# Patient Record
Sex: FEMALE | Race: WHITE | NOT HISPANIC OR LATINO | URBAN - METROPOLITAN AREA
[De-identification: names, ages, dates, MRNs, and addresses within clinical notes are randomized per-mention and may not be internally consistent; named-entity substitution may affect disease eponyms.]

---

## 2017-02-05 ENCOUNTER — EMERGENCY (EMERGENCY)
Facility: HOSPITAL | Age: 47
LOS: 0 days | Discharge: HOME | End: 2017-02-05
Attending: EMERGENCY MEDICINE | Admitting: EMERGENCY MEDICINE
Payer: OTHER MISCELLANEOUS

## 2017-02-05 VITALS
RESPIRATION RATE: 20 BRPM | SYSTOLIC BLOOD PRESSURE: 191 MMHG | TEMPERATURE: 98 F | DIASTOLIC BLOOD PRESSURE: 123 MMHG | HEART RATE: 88 BPM

## 2017-02-05 DIAGNOSIS — S82.092A OTHER FRACTURE OF LEFT PATELLA, INITIAL ENCOUNTER FOR CLOSED FRACTURE: ICD-10-CM
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
## 2017-02-05 LAB
APPEARANCE UR: CLEAR — SIGNIFICANT CHANGE UP
BACTERIA # UR AUTO: (no result)
BILIRUB UR-MCNC: NEGATIVE — SIGNIFICANT CHANGE UP
COLOR SPEC: YELLOW — SIGNIFICANT CHANGE UP
COMMENT - URINE: SIGNIFICANT CHANGE UP
DIFF PNL FLD: (no result)
EPI CELLS # UR: (no result)
GLUCOSE UR QL: NEGATIVE MG/DL — SIGNIFICANT CHANGE UP
HCG UR QL: NEGATIVE — SIGNIFICANT CHANGE UP
KETONES UR-MCNC: NEGATIVE — SIGNIFICANT CHANGE UP
LEUKOCYTE ESTERASE UR-ACNC: NEGATIVE — SIGNIFICANT CHANGE UP
NITRITE UR-MCNC: NEGATIVE — SIGNIFICANT CHANGE UP
PH UR: 7 — SIGNIFICANT CHANGE UP (ref 4.8–8)
PROT UR-MCNC: NEGATIVE MG/DL — SIGNIFICANT CHANGE UP
RBC CASTS # UR COMP ASSIST: (no result) /HPF (ref 0–4)
SP GR SPEC: 1.01 — SIGNIFICANT CHANGE UP (ref 1.01–1.02)
UROBILINOGEN FLD QL: NEGATIVE MG/DL — SIGNIFICANT CHANGE UP
WBC UR QL: (no result)

## 2017-02-05 PROCEDURE — 99284 EMERGENCY DEPT VISIT MOD MDM: CPT

## 2017-02-05 PROCEDURE — 73590 X-RAY EXAM OF LOWER LEG: CPT | Mod: 26,LT

## 2017-02-05 PROCEDURE — 73562 X-RAY EXAM OF KNEE 3: CPT | Mod: 26,LT

## 2017-02-05 NOTE — ED STATDOCS - OBJECTIVE STATEMENT
45 y/o F presents to ED for evaluation s/p onto cement while at work. Pt states fall occurred 2 days ago. She states she struck both knees on the ground but the left knee is worse in pain. She states the pain has been worsening and is severe when her left knee is touched. Pt denies n/v/d, fever, HA, SOB. Pt states she has not had a MP in 3 months and is unsure if she is pregnant/

## 2017-02-05 NOTE — ED STATDOCS - DETAILS:
I, Man Nj, performed the initial face to face bedside interview with this patient regarding history of present illness, review of symptoms and relevant past medical, social and family history.  I completed an independent physical examination.  I was the initial provider who evaluated this patient. I have signed out the follow up of any pending tests (i.e. labs, radiological studies) to the ACP.  I have communicated the patient’s plan of care and disposition with the ACP.  The history, relevant review of systems, past medical and surgical history, medical decision making, and physical examination was documented by the scribe in my presence and I attest to the accuracy of the documentation.  I personally saw the patient with the PA, and completed the key components of the history and physical exam. I then discussed the management plan with the PA.

## 2017-02-05 NOTE — ED STATDOCS - PRINCIPAL DIAGNOSIS
Closed nondisplaced fracture of right patella with malunion, unspecified fracture morphology, subsequent encounter

## 2017-02-05 NOTE — ED STATDOCS - PROGRESS NOTE DETAILS
45 yo female presents with left knee pain s/p fall. Pt was working at [employer] and tripepd over her feet and landed on both knee but had more pressure on the left knee. Gait slowed but able to place weight on the left leg. Pain meds and x-ray. - Seamus Butler PA-C. Knee immolizer placed for the left patellar fracture. Pt has ortho that she will f/u with.

## 2017-02-05 NOTE — ED STATDOCS - CARE PLAN
Principal Discharge DX:	Closed nondisplaced fracture of right patella with malunion, unspecified fracture morphology, subsequent encounter

## 2017-02-05 NOTE — ED STATDOCS - NS ED MD SCRIBE ATTENDING SCRIBE SECTIONS
PAST MEDICAL/SURGICAL/SOCIAL HISTORY/REVIEW OF SYSTEMS/VITAL SIGNS( Pullset)/PHYSICAL EXAM/DISPOSITION/HISTORY OF PRESENT ILLNESS

## 2017-06-15 ENCOUNTER — INPATIENT (INPATIENT)
Facility: HOSPITAL | Age: 47
LOS: 0 days | Discharge: HOME | End: 2017-06-16
Attending: INTERNAL MEDICINE | Admitting: INTERNAL MEDICINE
Payer: MEDICAID

## 2017-06-15 VITALS — WEIGHT: 250 LBS | HEIGHT: 65 IN

## 2017-06-15 LAB
ALBUMIN SERPL ELPH-MCNC: 3.6 G/DL — SIGNIFICANT CHANGE UP (ref 3.3–5)
ALP SERPL-CCNC: 98 U/L — SIGNIFICANT CHANGE UP (ref 40–120)
ALT FLD-CCNC: 19 U/L — SIGNIFICANT CHANGE UP (ref 12–78)
ANION GAP SERPL CALC-SCNC: 9 MMOL/L — SIGNIFICANT CHANGE UP (ref 5–17)
AST SERPL-CCNC: 14 U/L — LOW (ref 15–37)
BASOPHILS # BLD AUTO: 0.1 K/UL — SIGNIFICANT CHANGE UP (ref 0–0.2)
BASOPHILS NFR BLD AUTO: 0.8 % — SIGNIFICANT CHANGE UP (ref 0–2)
BILIRUB SERPL-MCNC: 0.4 MG/DL — SIGNIFICANT CHANGE UP (ref 0.2–1.2)
BUN SERPL-MCNC: 13 MG/DL — SIGNIFICANT CHANGE UP (ref 7–23)
CALCIUM SERPL-MCNC: 9.4 MG/DL — SIGNIFICANT CHANGE UP (ref 8.5–10.1)
CHLORIDE SERPL-SCNC: 104 MMOL/L — SIGNIFICANT CHANGE UP (ref 96–108)
CO2 SERPL-SCNC: 25 MMOL/L — SIGNIFICANT CHANGE UP (ref 22–31)
CREAT SERPL-MCNC: 0.77 MG/DL — SIGNIFICANT CHANGE UP (ref 0.5–1.3)
EOSINOPHIL # BLD AUTO: 0.3 K/UL — SIGNIFICANT CHANGE UP (ref 0–0.5)
EOSINOPHIL NFR BLD AUTO: 2.8 % — SIGNIFICANT CHANGE UP (ref 0–6)
GLUCOSE SERPL-MCNC: 92 MG/DL — SIGNIFICANT CHANGE UP (ref 70–99)
HCT VFR BLD CALC: 41.1 % — SIGNIFICANT CHANGE UP (ref 34.5–45)
HGB BLD-MCNC: 13.6 G/DL — SIGNIFICANT CHANGE UP (ref 11.5–15.5)
LYMPHOCYTES # BLD AUTO: 2.8 K/UL — SIGNIFICANT CHANGE UP (ref 1–3.3)
LYMPHOCYTES # BLD AUTO: 30 % — SIGNIFICANT CHANGE UP (ref 13–44)
MCHC RBC-ENTMCNC: 27.7 PG — SIGNIFICANT CHANGE UP (ref 27–34)
MCHC RBC-ENTMCNC: 33.1 GM/DL — SIGNIFICANT CHANGE UP (ref 32–36)
MCV RBC AUTO: 83.7 FL — SIGNIFICANT CHANGE UP (ref 80–100)
MONOCYTES # BLD AUTO: 0.5 K/UL — SIGNIFICANT CHANGE UP (ref 0–0.9)
MONOCYTES NFR BLD AUTO: 4.9 % — SIGNIFICANT CHANGE UP (ref 2–14)
NEUTROPHILS # BLD AUTO: 5.8 K/UL — SIGNIFICANT CHANGE UP (ref 1.8–7.4)
NEUTROPHILS NFR BLD AUTO: 61.5 % — SIGNIFICANT CHANGE UP (ref 43–77)
PLATELET # BLD AUTO: 259 K/UL — SIGNIFICANT CHANGE UP (ref 150–400)
POTASSIUM SERPL-MCNC: 3.9 MMOL/L — SIGNIFICANT CHANGE UP (ref 3.5–5.3)
POTASSIUM SERPL-SCNC: 3.9 MMOL/L — SIGNIFICANT CHANGE UP (ref 3.5–5.3)
PROT SERPL-MCNC: 8 GM/DL — SIGNIFICANT CHANGE UP (ref 6–8.3)
RBC # BLD: 4.91 M/UL — SIGNIFICANT CHANGE UP (ref 3.8–5.2)
RBC # FLD: 12.4 % — SIGNIFICANT CHANGE UP (ref 10.3–14.5)
SODIUM SERPL-SCNC: 138 MMOL/L — SIGNIFICANT CHANGE UP (ref 135–145)
WBC # BLD: 9.5 K/UL — SIGNIFICANT CHANGE UP (ref 3.8–10.5)
WBC # FLD AUTO: 9.5 K/UL — SIGNIFICANT CHANGE UP (ref 3.8–10.5)

## 2017-06-15 PROCEDURE — 93970 EXTREMITY STUDY: CPT | Mod: 26

## 2017-06-15 PROCEDURE — 71020: CPT | Mod: 26

## 2017-06-15 PROCEDURE — 93010 ELECTROCARDIOGRAM REPORT: CPT

## 2017-06-15 RX ORDER — RIVAROXABAN 15 MG-20MG
15 KIT ORAL DAILY
Qty: 0 | Refills: 0 | Status: DISCONTINUED | OUTPATIENT
Start: 2017-06-15 | End: 2017-06-15

## 2017-06-15 RX ORDER — ENOXAPARIN SODIUM 100 MG/ML
110 INJECTION SUBCUTANEOUS ONCE
Qty: 0 | Refills: 0 | Status: COMPLETED | OUTPATIENT
Start: 2017-06-15 | End: 2017-06-15

## 2017-06-15 RX ORDER — FONDAPARINUX SODIUM 2.5 MG/.5ML
1 INJECTION, SOLUTION SUBCUTANEOUS
Qty: 42 | Refills: 0 | OUTPATIENT
Start: 2017-06-15 | End: 2017-07-06

## 2017-06-15 RX ORDER — MORPHINE SULFATE 50 MG/1
4 CAPSULE, EXTENDED RELEASE ORAL ONCE
Qty: 0 | Refills: 0 | Status: DISCONTINUED | OUTPATIENT
Start: 2017-06-15 | End: 2017-06-15

## 2017-06-15 RX ORDER — ONDANSETRON 8 MG/1
4 TABLET, FILM COATED ORAL ONCE
Qty: 0 | Refills: 0 | Status: COMPLETED | OUTPATIENT
Start: 2017-06-15 | End: 2017-06-15

## 2017-06-15 RX ADMIN — MORPHINE SULFATE 4 MILLIGRAM(S): 50 CAPSULE, EXTENDED RELEASE ORAL at 23:57

## 2017-06-15 RX ADMIN — ENOXAPARIN SODIUM 110 MILLIGRAM(S): 100 INJECTION SUBCUTANEOUS at 23:57

## 2017-06-15 RX ADMIN — ONDANSETRON 4 MILLIGRAM(S): 8 TABLET, FILM COATED ORAL at 23:57

## 2017-06-15 NOTE — ED ADULT NURSE NOTE - OBJECTIVE STATEMENT
Pt reports fracturing left patella and currently in treatment with ortho and PT, was doing exercises with both legs and now presents with worsening BLE edema Left>Right, denies CP/SOB, BP elevated.

## 2017-06-15 NOTE — ED STATDOCS - CARE PLAN
Principal Discharge DX:	DVT (deep venous thrombosis)  Secondary Diagnosis:	Swelling of ankle  Secondary Diagnosis:	Swelling of ankle

## 2017-06-15 NOTE — ED ADULT TRIAGE NOTE - CHIEF COMPLAINT QUOTE
Pt. to the ED C/O Bilateral Ankle pain and HTN- Pt. states she has been having swelling since June 2017 and was evaluated by Ortho- As per patient, she has also been Hypertensive - Denies CP and SOB- Pt. also denies medical hx-

## 2017-06-15 NOTE — ED STATDOCS - OBJECTIVE STATEMENT
46 y/o F with a h/o left pattellar fx, c/o bilat LE pain and swelling since 6/6, sent in by Dr. Mares (ortho) for further eval. No PCP currently. No daily meds. No CP, SOB, abd pain, fevers, chills. Pt says she has had mild leg swelling in the past that would resolve on its own. She says she has been elevating her legs with no relief.

## 2017-06-15 NOTE — ED STATDOCS - PROGRESS NOTE DETAILS
JAGDISH Chapa:   Patient has been seen, evaluated and orders have been written by the attending in intake. Patient is stable.  I will follow up the results of orders written and I will continue to evaluate/observe the patient.  Yarelis Chapa PA-C + DVT verbal message.  Awaiting report.  Pt. educated on Xarelto use and need for fall precautions.  Yarelis Chapa PA-C patient with DVT, no health insurance so unable to fill xarelto; also uncontrolled HTN  will admit to hospital    -MD Maday

## 2017-06-16 VITALS
TEMPERATURE: 98 F | DIASTOLIC BLOOD PRESSURE: 88 MMHG | RESPIRATION RATE: 16 BRPM | HEART RATE: 94 BPM | SYSTOLIC BLOOD PRESSURE: 144 MMHG | OXYGEN SATURATION: 100 %

## 2017-06-16 PROCEDURE — 99284 EMERGENCY DEPT VISIT MOD MDM: CPT

## 2017-06-16 RX ORDER — ONDANSETRON 8 MG/1
4 TABLET, FILM COATED ORAL EVERY 6 HOURS
Qty: 0 | Refills: 0 | Status: DISCONTINUED | OUTPATIENT
Start: 2017-06-16 | End: 2017-06-16

## 2017-06-16 RX ORDER — RIVAROXABAN 15 MG-20MG
15 KIT ORAL DAILY
Qty: 0 | Refills: 0 | Status: DISCONTINUED | OUTPATIENT
Start: 2017-06-16 | End: 2017-06-16

## 2017-06-16 RX ORDER — AMLODIPINE BESYLATE 2.5 MG/1
5 TABLET ORAL DAILY
Qty: 0 | Refills: 0 | Status: DISCONTINUED | OUTPATIENT
Start: 2017-06-16 | End: 2017-06-16

## 2017-06-16 RX ORDER — DOCUSATE SODIUM 100 MG
100 CAPSULE ORAL THREE TIMES A DAY
Qty: 0 | Refills: 0 | Status: DISCONTINUED | OUTPATIENT
Start: 2017-06-16 | End: 2017-06-16

## 2017-06-16 RX ADMIN — RIVAROXABAN 15 MILLIGRAM(S): KIT at 11:40

## 2017-06-16 RX ADMIN — Medication 1 TABLET(S): at 08:27

## 2017-06-16 RX ADMIN — AMLODIPINE BESYLATE 5 MILLIGRAM(S): 2.5 TABLET ORAL at 08:27

## 2017-06-16 NOTE — DISCHARGE NOTE ADULT - HOSPITAL COURSE
Patient is 46yo female with PMhx of Left Patellar Fx, no other known history, presents to the ER complaining of b/l LE swelling. pt notes since June 6th she has had b/l LE swelling, L>R, associated with pain upon walking on the left foot. Pt denies fever, chills, chest pain, SOB, recent trauma to the LE. No other constitutional symptoms. Patient has been trying to elevate both feet, with no alleviation of symptoms. In the ER, LE Duplex revealed L posterior tibial DVT.     Patient was set up with Xarelto via social work. All meds and instructions given to the patient. Instructed to follow up with Dr Mares.

## 2017-06-16 NOTE — DISCHARGE NOTE ADULT - MEDICATION SUMMARY - MEDICATIONS TO CHANGE
I will SWITCH the dose or number of times a day I take the medications listed below when I get home from the hospital:    hydroCHLOROthiazide 25 mg oral tablet  -- 1 tab(s) by mouth once a day  -- Avoid prolonged or excessive exposure to direct and/or artificial sunlight while taking this medication.  It is very important that you take or use this exactly as directed.  Do not skip doses or discontinue unless directed by your doctor.  It may be advisable to drink a full glass orange juice or eat a banana daily while taking this medication.  Take with food or milk.

## 2017-06-16 NOTE — DISCHARGE NOTE ADULT - PATIENT PORTAL LINK FT
“You can access the Follow[facility] Patient Portal, offered by [facility], by registering with the following website: [email or web address]”

## 2017-06-16 NOTE — H&P ADULT - NSHPREVIEWOFSYSTEMS_GEN_ALL_CORE
(-)Fever, chills, cough, chest pain, sob, headache, dizziness, palpitations, abd pain, n/v/d  (+) b/l leg swelling

## 2017-06-16 NOTE — H&P ADULT - NSHPPHYSICALEXAM_GEN_ALL_CORE
T(C): 37.1, Max: 37.1 (06-15 @ 20:05)  HR: 89 (87 - 92)  BP: 135/72 (135/72 - 173/112)  RR: 16 (16 - 18)  SpO2: 100% (100% - 100%)  Wt(kg): --    Gen: AAOx3, NAD  HEENT: NCAT, EOMI  Neck: Supple  CV: nml S1S2, RRR  Lungs: CTABL  Abd: Soft, NT, ND, BS+  Ext: b/l LE Edema, non pitting  Neuro: Non focal

## 2017-06-16 NOTE — ED ADULT NURSE REASSESSMENT NOTE - NS ED NURSE REASSESS COMMENT FT1
Patient sitting in bed eating breakfast. Awaiting transport to the floor. Patient comfortable and offers no complaints. Call bell within reach. Rounding complete.

## 2017-06-16 NOTE — H&P ADULT - NSHPLABSRESULTS_GEN_ALL_CORE
13.6   9.5   )-----------( 259      ( 15 Navdeep 2017 20:40 )             41.1     15 Navdeep 2017 20:40    138    |  104    |  13     ----------------------------<  92     3.9     |  25     |  0.77     Ca    9.4        15 Navdeep 2017 20:40    TPro  8.0    /  Alb  3.6    /  TBili  0.4    /  DBili  x      /  AST  14     /  ALT  19     /  AlkPhos  98     15 Navdeep 2017 20:40      CAPILLARY BLOOD GLUCOSE    LIVER FUNCTIONS - ( 15 Navdeep 2017 20:40 )  Alb: 3.6 g/dL / Pro: 8.0 gm/dL / ALK PHOS: 98 U/L / ALT: 19 U/L / AST: 14 U/L / GGT: x

## 2017-06-16 NOTE — DISCHARGE NOTE ADULT - CARE PLAN
Principal Discharge DX:	Deep vein thrombosis (DVT) of tibial vein of left lower extremity, unspecified chronicity  Goal:	Please Take Xarelto as directed, and follow up with Dr Mares  Instructions for follow-up, activity and diet:	Low salt Diet  Goal:	Take Xarelto 15mg twice per day with food for 21 days. Then take Xarelto 20mg daily.

## 2017-06-16 NOTE — H&P ADULT - ASSESSMENT
46yo female a/w DVT      # DVT  - currently afebrile, HD stable, comfortable, 97% on RA, no SOB  - DVT is in the L posterior tibial vein, and although generally below the knee DVTs do not require A/C, in this case patient is symptomatic with LE edema and painful ambulation, in setting of bone fracture, would A/C  - Xarelto  -  consult for meds access    # HTN  - no endorsed history of HTN  - would start on Norvasc 5mg daily    # DVT ppx, Xarelto    # Admit, stable

## 2017-06-16 NOTE — H&P ADULT - HISTORY OF PRESENT ILLNESS
Patient is 46yo female with PMhx of Left Patellar Fx, no other known history, presents to the ER complaining of b/l LE swelling. pt notes since June 6th she has had b/l LE swelling, L>R, associated with pain upon walking on the left foot. Pt denies fever, chills, chest pain, SOB, recent trauma to the LE. No other constitutional symptoms. Patient has been trying to elevate both feet, with no alleviation of symptoms. In the ER, LE Duplex revealed L posterior tibial DVT.

## 2017-06-16 NOTE — DISCHARGE NOTE ADULT - PLAN OF CARE
Please Take Xarelto as directed, and follow up with Dr Mares Low salt Diet Take Xarelto 15mg twice per day with food for 21 days. Then take Xarelto 20mg daily.

## 2017-06-19 ENCOUNTER — EMERGENCY (EMERGENCY)
Facility: HOSPITAL | Age: 47
LOS: 1 days | Discharge: HOME | End: 2017-06-19
Attending: EMERGENCY MEDICINE | Admitting: EMERGENCY MEDICINE
Payer: MEDICAID

## 2017-06-19 VITALS — WEIGHT: 240.08 LBS

## 2017-06-19 DIAGNOSIS — M79.89 OTHER SPECIFIED SOFT TISSUE DISORDERS: ICD-10-CM

## 2017-06-19 DIAGNOSIS — Z86.718 PERSONAL HISTORY OF OTHER VENOUS THROMBOSIS AND EMBOLISM: ICD-10-CM

## 2017-06-19 DIAGNOSIS — I82.402 ACUTE EMBOLISM AND THROMBOSIS OF UNSPECIFIED DEEP VEINS OF LEFT LOWER EXTREMITY: ICD-10-CM

## 2017-06-19 DIAGNOSIS — Z79.01 LONG TERM (CURRENT) USE OF ANTICOAGULANTS: ICD-10-CM

## 2017-06-19 PROCEDURE — 99053 MED SERV 10PM-8AM 24 HR FAC: CPT

## 2017-06-19 PROCEDURE — 99283 EMERGENCY DEPT VISIT LOW MDM: CPT

## 2017-06-19 PROCEDURE — 73590 X-RAY EXAM OF LOWER LEG: CPT | Mod: 26,LT

## 2017-06-19 PROCEDURE — 93971 EXTREMITY STUDY: CPT | Mod: 26,LT

## 2017-06-19 NOTE — ED STATDOCS - PROGRESS NOTE DETAILS
Pt with stable appearing dvt, pt is unable to get her xarelto and took her lst dose tonight, wants to stay in er overnight for  in am

## 2017-06-19 NOTE — ED STATDOCS - MEDICAL DECISION MAKING DETAILS
pt with dvt left leg and ho patellar fracture from January, was d/c recently from hospital for dvt, marisa do us to check dvt

## 2017-06-19 NOTE — ED STATDOCS - OBJECTIVE STATEMENT
48 y/o F c/o increased LLE swelling, dx with LLE DVT a few days ago. She was told by pharmacy they couldn't give her more xarelto because it needed to be approved and came to ED. Pt last took xarelto this morning and this afternoon.

## 2017-06-20 VITALS
SYSTOLIC BLOOD PRESSURE: 128 MMHG | RESPIRATION RATE: 20 BRPM | HEART RATE: 87 BPM | DIASTOLIC BLOOD PRESSURE: 62 MMHG | OXYGEN SATURATION: 100 % | TEMPERATURE: 98 F

## 2017-06-20 RX ORDER — HYDROCHLOROTHIAZIDE 25 MG
25 TABLET ORAL ONCE
Qty: 0 | Refills: 0 | Status: COMPLETED | OUTPATIENT
Start: 2017-06-20 | End: 2017-06-20

## 2017-06-20 RX ORDER — RIVAROXABAN 15 MG-20MG
15 KIT ORAL ONCE
Qty: 0 | Refills: 0 | Status: COMPLETED | OUTPATIENT
Start: 2017-06-20 | End: 2017-06-20

## 2017-06-20 RX ORDER — ENOXAPARIN SODIUM 100 MG/ML
120 INJECTION SUBCUTANEOUS
Qty: 14 | Refills: 0 | OUTPATIENT
Start: 2017-06-20 | End: 2017-06-27

## 2017-06-20 RX ADMIN — Medication 25 MILLIGRAM(S): at 10:55

## 2017-06-20 RX ADMIN — RIVAROXABAN 15 MILLIGRAM(S): KIT at 09:00

## 2017-06-21 ENCOUNTER — APPOINTMENT (OUTPATIENT)
Dept: CARDIOLOGY | Facility: CLINIC | Age: 47
End: 2017-06-21
Payer: MEDICAID

## 2017-06-21 DIAGNOSIS — M79.89 OTHER SPECIFIED SOFT TISSUE DISORDERS: ICD-10-CM

## 2017-06-21 DIAGNOSIS — I10 ESSENTIAL (PRIMARY) HYPERTENSION: ICD-10-CM

## 2017-06-21 DIAGNOSIS — I82.442 ACUTE EMBOLISM AND THROMBOSIS OF LEFT TIBIAL VEIN: ICD-10-CM

## 2017-06-21 PROCEDURE — 85610 PROTHROMBIN TIME: CPT | Mod: QW

## 2017-06-21 PROCEDURE — 36416 COLLJ CAPILLARY BLOOD SPEC: CPT

## 2017-06-21 PROCEDURE — 99204 OFFICE O/P NEW MOD 45 MIN: CPT

## 2017-06-23 ENCOUNTER — APPOINTMENT (OUTPATIENT)
Dept: CARDIOLOGY | Facility: CLINIC | Age: 47
End: 2017-06-23

## 2017-06-26 ENCOUNTER — APPOINTMENT (OUTPATIENT)
Dept: CARDIOLOGY | Facility: CLINIC | Age: 47
End: 2017-06-26

## 2017-06-28 ENCOUNTER — APPOINTMENT (OUTPATIENT)
Dept: CARDIOLOGY | Facility: CLINIC | Age: 47
End: 2017-06-28

## 2017-07-03 ENCOUNTER — APPOINTMENT (OUTPATIENT)
Dept: CARDIOLOGY | Facility: CLINIC | Age: 47
End: 2017-07-03

## 2017-07-05 ENCOUNTER — APPOINTMENT (OUTPATIENT)
Dept: CARDIOLOGY | Facility: CLINIC | Age: 47
End: 2017-07-05

## 2017-07-12 ENCOUNTER — APPOINTMENT (OUTPATIENT)
Dept: CARDIOLOGY | Facility: CLINIC | Age: 47
End: 2017-07-12

## 2017-07-19 ENCOUNTER — APPOINTMENT (OUTPATIENT)
Dept: CARDIOLOGY | Facility: CLINIC | Age: 47
End: 2017-07-19

## 2017-07-26 ENCOUNTER — APPOINTMENT (OUTPATIENT)
Dept: CARDIOLOGY | Facility: CLINIC | Age: 47
End: 2017-07-26

## 2017-08-02 ENCOUNTER — RX RENEWAL (OUTPATIENT)
Age: 47
End: 2017-08-02

## 2017-08-02 ENCOUNTER — APPOINTMENT (OUTPATIENT)
Dept: CARDIOLOGY | Facility: CLINIC | Age: 47
End: 2017-08-02
Payer: SELF-PAY

## 2017-08-02 DIAGNOSIS — I10 ESSENTIAL (PRIMARY) HYPERTENSION: ICD-10-CM

## 2017-08-02 PROCEDURE — 85610 PROTHROMBIN TIME: CPT | Mod: QW

## 2017-08-02 PROCEDURE — 99213 OFFICE O/P EST LOW 20 MIN: CPT

## 2017-08-02 PROCEDURE — 36416 COLLJ CAPILLARY BLOOD SPEC: CPT

## 2017-08-02 RX ORDER — HYDROCHLOROTHIAZIDE 25 MG/1
25 TABLET ORAL DAILY
Qty: 30 | Refills: 0 | Status: ACTIVE | COMMUNITY
Start: 2017-08-02 | End: 1900-01-01

## 2017-08-02 RX ORDER — HYDROCHLOROTHIAZIDE 25 MG/1
25 TABLET ORAL DAILY
Qty: 30 | Refills: 1 | Status: ACTIVE | COMMUNITY
Start: 2017-08-02 | End: 1900-01-01

## 2017-08-10 ENCOUNTER — APPOINTMENT (OUTPATIENT)
Dept: CARDIOLOGY | Facility: CLINIC | Age: 47
End: 2017-08-10
Payer: MEDICAID

## 2017-08-10 PROCEDURE — 36416 COLLJ CAPILLARY BLOOD SPEC: CPT

## 2017-08-10 PROCEDURE — 85610 PROTHROMBIN TIME: CPT | Mod: QW

## 2017-08-10 PROCEDURE — 99213 OFFICE O/P EST LOW 20 MIN: CPT

## 2017-08-15 ENCOUNTER — APPOINTMENT (OUTPATIENT)
Dept: CARDIOLOGY | Facility: CLINIC | Age: 47
End: 2017-08-15
Payer: SELF-PAY

## 2017-08-15 PROCEDURE — 99213 OFFICE O/P EST LOW 20 MIN: CPT

## 2017-08-15 PROCEDURE — 36416 COLLJ CAPILLARY BLOOD SPEC: CPT

## 2017-08-15 PROCEDURE — 85610 PROTHROMBIN TIME: CPT | Mod: QW

## 2017-08-18 ENCOUNTER — APPOINTMENT (OUTPATIENT)
Dept: CARDIOLOGY | Facility: CLINIC | Age: 47
End: 2017-08-18
Payer: SELF-PAY

## 2017-08-18 PROCEDURE — 36416 COLLJ CAPILLARY BLOOD SPEC: CPT

## 2017-08-18 PROCEDURE — 85610 PROTHROMBIN TIME: CPT | Mod: QW

## 2017-08-18 PROCEDURE — 99213 OFFICE O/P EST LOW 20 MIN: CPT

## 2017-08-23 ENCOUNTER — EMERGENCY (EMERGENCY)
Facility: HOSPITAL | Age: 47
LOS: 0 days | Discharge: HOME | End: 2017-08-23
Attending: EMERGENCY MEDICINE | Admitting: EMERGENCY MEDICINE
Payer: MEDICAID

## 2017-08-23 ENCOUNTER — APPOINTMENT (OUTPATIENT)
Dept: CARDIOLOGY | Facility: CLINIC | Age: 47
End: 2017-08-23
Payer: SELF-PAY

## 2017-08-23 VITALS — WEIGHT: 240.08 LBS

## 2017-08-23 VITALS — TEMPERATURE: 99 F

## 2017-08-23 DIAGNOSIS — Z88.1 ALLERGY STATUS TO OTHER ANTIBIOTIC AGENTS STATUS: ICD-10-CM

## 2017-08-23 DIAGNOSIS — N39.0 URINARY TRACT INFECTION, SITE NOT SPECIFIED: ICD-10-CM

## 2017-08-23 DIAGNOSIS — I82.409 ACUTE EMBOLISM AND THROMBOSIS OF UNSPECIFIED DEEP VEINS OF UNSPECIFIED LOWER EXTREMITY: ICD-10-CM

## 2017-08-23 DIAGNOSIS — R35.0 FREQUENCY OF MICTURITION: ICD-10-CM

## 2017-08-23 DIAGNOSIS — Z79.01 LONG TERM (CURRENT) USE OF ANTICOAGULANTS: ICD-10-CM

## 2017-08-23 DIAGNOSIS — Z88.2 ALLERGY STATUS TO SULFONAMIDES: ICD-10-CM

## 2017-08-23 LAB
APPEARANCE UR: (no result)
BACTERIA # UR AUTO: (no result)
BILIRUB UR-MCNC: NEGATIVE — SIGNIFICANT CHANGE UP
COLOR SPEC: (no result)
DIFF PNL FLD: (no result)
EPI CELLS # UR: SIGNIFICANT CHANGE UP
GLUCOSE UR QL: NEGATIVE MG/DL — SIGNIFICANT CHANGE UP
KETONES UR-MCNC: (no result)
LEUKOCYTE ESTERASE UR-ACNC: (no result)
NITRITE UR-MCNC: NEGATIVE — SIGNIFICANT CHANGE UP
PH UR: 6.5 — SIGNIFICANT CHANGE UP (ref 5–8)
PROT UR-MCNC: 500 MG/DL
RBC CASTS # UR COMP ASSIST: >50 /HPF (ref 0–4)
SP GR SPEC: 1.02 — SIGNIFICANT CHANGE UP (ref 1.01–1.02)
UROBILINOGEN FLD QL: NEGATIVE MG/DL — SIGNIFICANT CHANGE UP
WBC UR QL: (no result)

## 2017-08-23 PROCEDURE — 99284 EMERGENCY DEPT VISIT MOD MDM: CPT

## 2017-08-23 PROCEDURE — 99213 OFFICE O/P EST LOW 20 MIN: CPT

## 2017-08-23 PROCEDURE — 85610 PROTHROMBIN TIME: CPT | Mod: QW

## 2017-08-23 PROCEDURE — 36416 COLLJ CAPILLARY BLOOD SPEC: CPT

## 2017-08-23 RX ORDER — CIPROFLOXACIN LACTATE 400MG/40ML
500 VIAL (ML) INTRAVENOUS ONCE
Qty: 0 | Refills: 0 | Status: COMPLETED | OUTPATIENT
Start: 2017-08-23 | End: 2017-08-23

## 2017-08-23 RX ORDER — CIPROFLOXACIN LACTATE 400MG/40ML
1 VIAL (ML) INTRAVENOUS
Qty: 14 | Refills: 0 | OUTPATIENT
Start: 2017-08-23 | End: 2017-08-30

## 2017-08-23 RX ADMIN — Medication 500 MILLIGRAM(S): at 09:50

## 2017-08-23 NOTE — ED PROVIDER NOTE - OBJECTIVE STATEMENT
46 y/o F with a PMHx of DVT presents to the ED c/o worsening increased urinary urgency with little to no output and L flank pain over the past 4-5 days. Pt states that she is not experiencing dysuria and is not febrile here in ED. Pt currently laying comfortably and denies any other acute c/o at this time.

## 2017-08-24 ENCOUNTER — EMERGENCY (EMERGENCY)
Facility: HOSPITAL | Age: 47
LOS: 0 days | Discharge: HOME | End: 2017-08-24
Attending: EMERGENCY MEDICINE | Admitting: EMERGENCY MEDICINE
Payer: MEDICAID

## 2017-08-24 VITALS
RESPIRATION RATE: 19 BRPM | DIASTOLIC BLOOD PRESSURE: 88 MMHG | OXYGEN SATURATION: 100 % | HEART RATE: 85 BPM | TEMPERATURE: 99 F | SYSTOLIC BLOOD PRESSURE: 145 MMHG

## 2017-08-24 VITALS — HEIGHT: 65 IN | WEIGHT: 240.08 LBS

## 2017-08-24 DIAGNOSIS — Z88.1 ALLERGY STATUS TO OTHER ANTIBIOTIC AGENTS STATUS: ICD-10-CM

## 2017-08-24 DIAGNOSIS — Z51.81 ENCOUNTER FOR THERAPEUTIC DRUG LEVEL MONITORING: ICD-10-CM

## 2017-08-24 DIAGNOSIS — R10.9 UNSPECIFIED ABDOMINAL PAIN: ICD-10-CM

## 2017-08-24 DIAGNOSIS — Z88.2 ALLERGY STATUS TO SULFONAMIDES: ICD-10-CM

## 2017-08-24 DIAGNOSIS — I82.409 ACUTE EMBOLISM AND THROMBOSIS OF UNSPECIFIED DEEP VEINS OF UNSPECIFIED LOWER EXTREMITY: ICD-10-CM

## 2017-08-24 DIAGNOSIS — Z79.01 LONG TERM (CURRENT) USE OF ANTICOAGULANTS: ICD-10-CM

## 2017-08-24 LAB
ALBUMIN SERPL ELPH-MCNC: 3.6 G/DL — SIGNIFICANT CHANGE UP (ref 3.3–5)
ALP SERPL-CCNC: 99 U/L — SIGNIFICANT CHANGE UP (ref 40–120)
ALT FLD-CCNC: 25 U/L — SIGNIFICANT CHANGE UP (ref 12–78)
ANION GAP SERPL CALC-SCNC: 8 MMOL/L — SIGNIFICANT CHANGE UP (ref 5–17)
APPEARANCE UR: CLEAR — SIGNIFICANT CHANGE UP
APTT BLD: 35.9 SEC — SIGNIFICANT CHANGE UP (ref 27.5–37.4)
AST SERPL-CCNC: 17 U/L — SIGNIFICANT CHANGE UP (ref 15–37)
BACTERIA # UR AUTO: (no result)
BASOPHILS # BLD AUTO: 0 K/UL — SIGNIFICANT CHANGE UP (ref 0–0.2)
BASOPHILS NFR BLD AUTO: 0.5 % — SIGNIFICANT CHANGE UP (ref 0–2)
BILIRUB SERPL-MCNC: 0.4 MG/DL — SIGNIFICANT CHANGE UP (ref 0.2–1.2)
BILIRUB UR-MCNC: NEGATIVE — SIGNIFICANT CHANGE UP
BUN SERPL-MCNC: 12 MG/DL — SIGNIFICANT CHANGE UP (ref 7–23)
CALCIUM SERPL-MCNC: 9 MG/DL — SIGNIFICANT CHANGE UP (ref 8.5–10.1)
CHLORIDE SERPL-SCNC: 101 MMOL/L — SIGNIFICANT CHANGE UP (ref 96–108)
CO2 SERPL-SCNC: 28 MMOL/L — SIGNIFICANT CHANGE UP (ref 22–31)
COLOR SPEC: YELLOW — SIGNIFICANT CHANGE UP
CREAT SERPL-MCNC: 1.08 MG/DL — SIGNIFICANT CHANGE UP (ref 0.5–1.3)
DIFF PNL FLD: (no result)
EOSINOPHIL # BLD AUTO: 0.3 K/UL — SIGNIFICANT CHANGE UP (ref 0–0.5)
EOSINOPHIL NFR BLD AUTO: 3.7 % — SIGNIFICANT CHANGE UP (ref 0–6)
EPI CELLS # UR: SIGNIFICANT CHANGE UP
GLUCOSE SERPL-MCNC: 118 MG/DL — HIGH (ref 70–99)
GLUCOSE UR QL: NEGATIVE MG/DL — SIGNIFICANT CHANGE UP
HCT VFR BLD CALC: 39.2 % — SIGNIFICANT CHANGE UP (ref 34.5–45)
HGB BLD-MCNC: 13.6 G/DL — SIGNIFICANT CHANGE UP (ref 11.5–15.5)
INR BLD: 1.9 RATIO — HIGH (ref 0.88–1.16)
KETONES UR-MCNC: NEGATIVE — SIGNIFICANT CHANGE UP
LEUKOCYTE ESTERASE UR-ACNC: (no result)
LIDOCAIN IGE QN: 127 U/L — SIGNIFICANT CHANGE UP (ref 73–393)
LYMPHOCYTES # BLD AUTO: 2.9 K/UL — SIGNIFICANT CHANGE UP (ref 1–3.3)
LYMPHOCYTES # BLD AUTO: 32.9 % — SIGNIFICANT CHANGE UP (ref 13–44)
MCHC RBC-ENTMCNC: 27.9 PG — SIGNIFICANT CHANGE UP (ref 27–34)
MCHC RBC-ENTMCNC: 34.6 GM/DL — SIGNIFICANT CHANGE UP (ref 32–36)
MCV RBC AUTO: 80.7 FL — SIGNIFICANT CHANGE UP (ref 80–100)
MONOCYTES # BLD AUTO: 0.3 K/UL — SIGNIFICANT CHANGE UP (ref 0–0.9)
MONOCYTES NFR BLD AUTO: 3.3 % — SIGNIFICANT CHANGE UP (ref 2–14)
NEUTROPHILS # BLD AUTO: 5.2 K/UL — SIGNIFICANT CHANGE UP (ref 1.8–7.4)
NEUTROPHILS NFR BLD AUTO: 59.7 % — SIGNIFICANT CHANGE UP (ref 43–77)
NITRITE UR-MCNC: NEGATIVE — SIGNIFICANT CHANGE UP
PH UR: 6 — SIGNIFICANT CHANGE UP (ref 5–8)
PLATELET # BLD AUTO: 255 K/UL — SIGNIFICANT CHANGE UP (ref 150–400)
POTASSIUM SERPL-MCNC: 2.9 MMOL/L — CRITICAL LOW (ref 3.5–5.3)
POTASSIUM SERPL-SCNC: 2.9 MMOL/L — CRITICAL LOW (ref 3.5–5.3)
PROT SERPL-MCNC: 7.6 GM/DL — SIGNIFICANT CHANGE UP (ref 6–8.3)
PROT UR-MCNC: 15 MG/DL
PROTHROM AB SERPL-ACNC: 20.8 SEC — HIGH (ref 9.8–12.7)
RBC # BLD: 4.86 M/UL — SIGNIFICANT CHANGE UP (ref 3.8–5.2)
RBC # FLD: 12.8 % — SIGNIFICANT CHANGE UP (ref 10.3–14.5)
RBC CASTS # UR COMP ASSIST: (no result) /HPF (ref 0–4)
SODIUM SERPL-SCNC: 137 MMOL/L — SIGNIFICANT CHANGE UP (ref 135–145)
SP GR SPEC: 1.01 — SIGNIFICANT CHANGE UP (ref 1.01–1.02)
UROBILINOGEN FLD QL: NEGATIVE MG/DL — SIGNIFICANT CHANGE UP
WBC # BLD: 8.8 K/UL — SIGNIFICANT CHANGE UP (ref 3.8–10.5)
WBC # FLD AUTO: 8.8 K/UL — SIGNIFICANT CHANGE UP (ref 3.8–10.5)
WBC UR QL: (no result)

## 2017-08-24 PROCEDURE — 99053 MED SERV 10PM-8AM 24 HR FAC: CPT

## 2017-08-24 PROCEDURE — 74176 CT ABD & PELVIS W/O CONTRAST: CPT | Mod: 26

## 2017-08-24 PROCEDURE — 99284 EMERGENCY DEPT VISIT MOD MDM: CPT | Mod: 25

## 2017-08-24 RX ORDER — FONDAPARINUX SODIUM 2.5 MG/.5ML
1 INJECTION, SOLUTION SUBCUTANEOUS
Qty: 0 | Refills: 0 | COMMUNITY

## 2017-08-24 RX ORDER — IBUPROFEN 200 MG
600 TABLET ORAL ONCE
Qty: 0 | Refills: 0 | Status: COMPLETED | OUTPATIENT
Start: 2017-08-24 | End: 2017-08-24

## 2017-08-24 RX ORDER — POTASSIUM CHLORIDE 20 MEQ
40 PACKET (EA) ORAL EVERY 4 HOURS
Qty: 0 | Refills: 0 | Status: DISCONTINUED | OUTPATIENT
Start: 2017-08-24 | End: 2017-08-24

## 2017-08-24 RX ORDER — CIPROFLOXACIN LACTATE 400MG/40ML
400 VIAL (ML) INTRAVENOUS ONCE
Qty: 0 | Refills: 0 | Status: COMPLETED | OUTPATIENT
Start: 2017-08-24 | End: 2017-08-24

## 2017-08-24 RX ORDER — CIPROFLOXACIN LACTATE 400MG/40ML
500 VIAL (ML) INTRAVENOUS ONCE
Qty: 0 | Refills: 0 | Status: COMPLETED | OUTPATIENT
Start: 2017-08-24 | End: 2017-08-24

## 2017-08-24 RX ORDER — SODIUM CHLORIDE 9 MG/ML
1000 INJECTION INTRAMUSCULAR; INTRAVENOUS; SUBCUTANEOUS ONCE
Qty: 0 | Refills: 0 | Status: COMPLETED | OUTPATIENT
Start: 2017-08-24 | End: 2017-08-24

## 2017-08-24 RX ORDER — ACETAMINOPHEN 500 MG
1000 TABLET ORAL ONCE
Qty: 0 | Refills: 0 | Status: COMPLETED | OUTPATIENT
Start: 2017-08-24 | End: 2017-08-24

## 2017-08-24 RX ORDER — ONDANSETRON 8 MG/1
4 TABLET, FILM COATED ORAL ONCE
Qty: 0 | Refills: 0 | Status: COMPLETED | OUTPATIENT
Start: 2017-08-24 | End: 2017-08-24

## 2017-08-24 RX ADMIN — Medication 600 MILLIGRAM(S): at 21:48

## 2017-08-24 RX ADMIN — Medication 200 MILLIGRAM(S): at 21:40

## 2017-08-24 RX ADMIN — ONDANSETRON 4 MILLIGRAM(S): 8 TABLET, FILM COATED ORAL at 22:55

## 2017-08-24 RX ADMIN — Medication 40 MILLIEQUIVALENT(S): at 22:55

## 2017-08-24 RX ADMIN — Medication 1000 MILLIGRAM(S): at 21:48

## 2017-08-24 RX ADMIN — SODIUM CHLORIDE 1000 MILLILITER(S): 9 INJECTION INTRAMUSCULAR; INTRAVENOUS; SUBCUTANEOUS at 21:48

## 2017-08-24 NOTE — ED STATDOCS - PROGRESS NOTE DETAILS
JAGDISH Paulino:  Pt seen and exmaine dby intake provider. she presented with flank pain and dysuria, was seen in ED yesterday and d rosa maria on cipro without any resolution of her sxs. I will followup labs, UA and CT A/P to r/o kidney stone. JAGDISH Paulino:  Pt with subtherapeutic INR, s/w pt stated she took 5 mg of coumadin today, (alternates between 2.5 mg and 5 mg). Her INR yesterday was 2.5. Advised her to get it rechecked on saturday. Aware that abx can cause the INR to be low. JAGDISH Paulino:  Pt aware that cipro can affect the INR levels, she has already contacted the coumadin clinic.

## 2017-08-24 NOTE — ED STATDOCS - ATTENDING CONTRIBUTION TO CARE
Attending Contribution to Care: I, Shakira Francis, performed the initial face to face bedside interview with this patient regarding history of present illness, review of symptoms and relevant past medical, social and family history.  I completed an independent physical examination.  I was the initial provider who evaluated this patient. I have signed out the follow up of any pending tests (i.e. labs, radiological studies) to the ACP.  I have communicated the patient’s plan of care and disposition with the ACP.

## 2017-08-24 NOTE — ED STATDOCS - OBJECTIVE STATEMENT
46 yo female h/o recent DVT on coumadin, presents with left flank pain and hematuria. Pt was evaluated here yesterday, given Cipro for UTI. Does not have PMD.

## 2017-08-24 NOTE — ED ADULT TRIAGE NOTE - CHIEF COMPLAINT QUOTE
Pt. to the ED C/O Left Sided Flank Pain that radiates to the back with urinary symptoms- Pt. states she was evaluated in the ED 24 Hrs ago for UTI Symptoms with Cipro PO. Hx. of recent blood clot in left leg, on coumadin

## 2017-08-24 NOTE — ED ADULT NURSE NOTE - OBJECTIVE STATEMENT
Pt presents to ER c/o left sided flank pain radiating across back. Pt reports blood in urine yesterday, denies burning urination. Pt denies trauma. Bladder is non distended non tender. AO x 3 oriented to baseline, normal breathing pattern with no difficulty

## 2017-08-25 LAB
-  AMIKACIN: SIGNIFICANT CHANGE UP
-  AMPICILLIN/SULBACTAM: SIGNIFICANT CHANGE UP
-  AMPICILLIN: SIGNIFICANT CHANGE UP
-  AZTREONAM: SIGNIFICANT CHANGE UP
-  CEFAZOLIN: SIGNIFICANT CHANGE UP
-  CEFEPIME: SIGNIFICANT CHANGE UP
-  CEFOXITIN: SIGNIFICANT CHANGE UP
-  CEFTAZIDIME: SIGNIFICANT CHANGE UP
-  CEFTRIAXONE: SIGNIFICANT CHANGE UP
-  CIPROFLOXACIN: SIGNIFICANT CHANGE UP
-  ERTAPENEM: SIGNIFICANT CHANGE UP
-  GENTAMICIN: SIGNIFICANT CHANGE UP
-  LEVOFLOXACIN: SIGNIFICANT CHANGE UP
-  MEROPENEM: SIGNIFICANT CHANGE UP
-  NITROFURANTOIN: SIGNIFICANT CHANGE UP
-  PIPERACILLIN/TAZOBACTAM: SIGNIFICANT CHANGE UP
-  TOBRAMYCIN: SIGNIFICANT CHANGE UP
-  TRIMETHOPRIM/SULFAMETHOXAZOLE: SIGNIFICANT CHANGE UP
CULTURE RESULTS: SIGNIFICANT CHANGE UP
METHOD TYPE: SIGNIFICANT CHANGE UP
ORGANISM # SPEC MICROSCOPIC CNT: SIGNIFICANT CHANGE UP
ORGANISM # SPEC MICROSCOPIC CNT: SIGNIFICANT CHANGE UP
SPECIMEN SOURCE: SIGNIFICANT CHANGE UP

## 2017-08-26 ENCOUNTER — APPOINTMENT (OUTPATIENT)
Dept: CARDIOLOGY | Facility: CLINIC | Age: 47
End: 2017-08-26
Payer: SELF-PAY

## 2017-08-26 PROCEDURE — 99213 OFFICE O/P EST LOW 20 MIN: CPT

## 2017-08-26 PROCEDURE — 85610 PROTHROMBIN TIME: CPT | Mod: QW

## 2017-08-26 PROCEDURE — 36416 COLLJ CAPILLARY BLOOD SPEC: CPT

## 2017-08-30 ENCOUNTER — APPOINTMENT (OUTPATIENT)
Dept: CARDIOLOGY | Facility: CLINIC | Age: 47
End: 2017-08-30
Payer: SELF-PAY

## 2017-08-30 PROCEDURE — 85610 PROTHROMBIN TIME: CPT | Mod: QW

## 2017-08-30 PROCEDURE — 99213 OFFICE O/P EST LOW 20 MIN: CPT

## 2017-08-30 PROCEDURE — 36416 COLLJ CAPILLARY BLOOD SPEC: CPT

## 2017-09-06 ENCOUNTER — APPOINTMENT (OUTPATIENT)
Dept: CARDIOLOGY | Facility: CLINIC | Age: 47
End: 2017-09-06
Payer: SELF-PAY

## 2017-09-06 PROCEDURE — 99213 OFFICE O/P EST LOW 20 MIN: CPT

## 2017-09-06 PROCEDURE — 85610 PROTHROMBIN TIME: CPT | Mod: QW

## 2017-09-06 PROCEDURE — 36416 COLLJ CAPILLARY BLOOD SPEC: CPT

## 2017-09-13 ENCOUNTER — APPOINTMENT (OUTPATIENT)
Dept: CARDIOLOGY | Facility: CLINIC | Age: 47
End: 2017-09-13
Payer: MEDICAID

## 2017-09-13 DIAGNOSIS — Z91.89 OTHER SPECIFIED PERSONAL RISK FACTORS, NOT ELSEWHERE CLASSIFIED: ICD-10-CM

## 2017-09-13 PROCEDURE — 99213 OFFICE O/P EST LOW 20 MIN: CPT

## 2017-09-13 PROCEDURE — 85610 PROTHROMBIN TIME: CPT | Mod: QW

## 2017-09-13 PROCEDURE — 36416 COLLJ CAPILLARY BLOOD SPEC: CPT

## 2017-09-19 ENCOUNTER — APPOINTMENT (OUTPATIENT)
Dept: CARDIOLOGY | Facility: CLINIC | Age: 47
End: 2017-09-19
Payer: MEDICAID

## 2017-09-19 PROCEDURE — 99213 OFFICE O/P EST LOW 20 MIN: CPT

## 2017-09-19 PROCEDURE — 85610 PROTHROMBIN TIME: CPT | Mod: QW

## 2017-09-19 PROCEDURE — 36416 COLLJ CAPILLARY BLOOD SPEC: CPT

## 2017-09-21 ENCOUNTER — EMERGENCY (EMERGENCY)
Facility: HOSPITAL | Age: 47
LOS: 0 days | Discharge: HOME | End: 2017-09-21
Attending: EMERGENCY MEDICINE | Admitting: EMERGENCY MEDICINE
Payer: MEDICAID

## 2017-09-21 VITALS
DIASTOLIC BLOOD PRESSURE: 90 MMHG | SYSTOLIC BLOOD PRESSURE: 157 MMHG | TEMPERATURE: 98 F | OXYGEN SATURATION: 100 % | HEART RATE: 82 BPM | RESPIRATION RATE: 20 BRPM

## 2017-09-21 VITALS — WEIGHT: 259.93 LBS

## 2017-09-21 DIAGNOSIS — Z88.2 ALLERGY STATUS TO SULFONAMIDES: ICD-10-CM

## 2017-09-21 DIAGNOSIS — Z88.1 ALLERGY STATUS TO OTHER ANTIBIOTIC AGENTS STATUS: ICD-10-CM

## 2017-09-21 DIAGNOSIS — I10 ESSENTIAL (PRIMARY) HYPERTENSION: ICD-10-CM

## 2017-09-21 DIAGNOSIS — R42 DIZZINESS AND GIDDINESS: ICD-10-CM

## 2017-09-21 DIAGNOSIS — Z86.718 PERSONAL HISTORY OF OTHER VENOUS THROMBOSIS AND EMBOLISM: ICD-10-CM

## 2017-09-21 DIAGNOSIS — H81.10 BENIGN PAROXYSMAL VERTIGO, UNSPECIFIED EAR: ICD-10-CM

## 2017-09-21 DIAGNOSIS — Z79.01 LONG TERM (CURRENT) USE OF ANTICOAGULANTS: ICD-10-CM

## 2017-09-21 DIAGNOSIS — N39.0 URINARY TRACT INFECTION, SITE NOT SPECIFIED: ICD-10-CM

## 2017-09-21 LAB
ALBUMIN SERPL ELPH-MCNC: 3.8 G/DL — SIGNIFICANT CHANGE UP (ref 3.3–5)
ALP SERPL-CCNC: 105 U/L — SIGNIFICANT CHANGE UP (ref 40–120)
ALT FLD-CCNC: 38 U/L — SIGNIFICANT CHANGE UP (ref 12–78)
ANION GAP SERPL CALC-SCNC: 8 MMOL/L — SIGNIFICANT CHANGE UP (ref 5–17)
APPEARANCE UR: CLEAR — SIGNIFICANT CHANGE UP
APTT BLD: 37.3 SEC — SIGNIFICANT CHANGE UP (ref 27.5–37.4)
AST SERPL-CCNC: 30 U/L — SIGNIFICANT CHANGE UP (ref 15–37)
BACTERIA # UR AUTO: (no result)
BASOPHILS # BLD AUTO: 0.1 K/UL — SIGNIFICANT CHANGE UP (ref 0–0.2)
BASOPHILS NFR BLD AUTO: 1 % — SIGNIFICANT CHANGE UP (ref 0–2)
BILIRUB SERPL-MCNC: 0.6 MG/DL — SIGNIFICANT CHANGE UP (ref 0.2–1.2)
BILIRUB UR-MCNC: NEGATIVE — SIGNIFICANT CHANGE UP
BUN SERPL-MCNC: 16 MG/DL — SIGNIFICANT CHANGE UP (ref 7–23)
CALCIUM SERPL-MCNC: 9 MG/DL — SIGNIFICANT CHANGE UP (ref 8.5–10.1)
CHLORIDE SERPL-SCNC: 100 MMOL/L — SIGNIFICANT CHANGE UP (ref 96–108)
CO2 SERPL-SCNC: 29 MMOL/L — SIGNIFICANT CHANGE UP (ref 22–31)
COLOR SPEC: YELLOW — SIGNIFICANT CHANGE UP
CREAT SERPL-MCNC: 0.85 MG/DL — SIGNIFICANT CHANGE UP (ref 0.5–1.3)
DIFF PNL FLD: (no result)
EOSINOPHIL # BLD AUTO: 0.2 K/UL — SIGNIFICANT CHANGE UP (ref 0–0.5)
EOSINOPHIL NFR BLD AUTO: 2.5 % — SIGNIFICANT CHANGE UP (ref 0–6)
GLUCOSE SERPL-MCNC: 105 MG/DL — HIGH (ref 70–99)
GLUCOSE UR QL: NEGATIVE MG/DL — SIGNIFICANT CHANGE UP
HCT VFR BLD CALC: 40.2 % — SIGNIFICANT CHANGE UP (ref 34.5–45)
HGB BLD-MCNC: 13.5 G/DL — SIGNIFICANT CHANGE UP (ref 11.5–15.5)
INR BLD: 1.93 RATIO — HIGH (ref 0.88–1.16)
KETONES UR-MCNC: NEGATIVE — SIGNIFICANT CHANGE UP
LEUKOCYTE ESTERASE UR-ACNC: (no result)
LYMPHOCYTES # BLD AUTO: 1.7 K/UL — SIGNIFICANT CHANGE UP (ref 1–3.3)
LYMPHOCYTES # BLD AUTO: 19.1 % — SIGNIFICANT CHANGE UP (ref 13–44)
MCHC RBC-ENTMCNC: 27.3 PG — SIGNIFICANT CHANGE UP (ref 27–34)
MCHC RBC-ENTMCNC: 33.6 GM/DL — SIGNIFICANT CHANGE UP (ref 32–36)
MCV RBC AUTO: 81.2 FL — SIGNIFICANT CHANGE UP (ref 80–100)
MONOCYTES # BLD AUTO: 0.4 K/UL — SIGNIFICANT CHANGE UP (ref 0–0.9)
MONOCYTES NFR BLD AUTO: 5 % — SIGNIFICANT CHANGE UP (ref 2–14)
NEUTROPHILS # BLD AUTO: 6.3 K/UL — SIGNIFICANT CHANGE UP (ref 1.8–7.4)
NEUTROPHILS NFR BLD AUTO: 72.4 % — SIGNIFICANT CHANGE UP (ref 43–77)
NITRITE UR-MCNC: NEGATIVE — SIGNIFICANT CHANGE UP
PH UR: 6 — SIGNIFICANT CHANGE UP (ref 5–8)
PLATELET # BLD AUTO: 231 K/UL — SIGNIFICANT CHANGE UP (ref 150–400)
POTASSIUM SERPL-MCNC: 3.2 MMOL/L — LOW (ref 3.5–5.3)
POTASSIUM SERPL-SCNC: 3.2 MMOL/L — LOW (ref 3.5–5.3)
PROT SERPL-MCNC: 8.4 G/DL — HIGH (ref 6–8.3)
PROT UR-MCNC: 15 MG/DL
PROTHROM AB SERPL-ACNC: 21.1 SEC — HIGH (ref 9.8–12.7)
RBC # BLD: 4.94 M/UL — SIGNIFICANT CHANGE UP (ref 3.8–5.2)
RBC # FLD: 13.1 % — SIGNIFICANT CHANGE UP (ref 10.3–14.5)
RBC CASTS # UR COMP ASSIST: (no result) /HPF (ref 0–4)
SODIUM SERPL-SCNC: 137 MMOL/L — SIGNIFICANT CHANGE UP (ref 135–145)
SP GR SPEC: 1.02 — SIGNIFICANT CHANGE UP (ref 1.01–1.02)
TROPONIN I SERPL-MCNC: <.015 NG/ML — SIGNIFICANT CHANGE UP (ref 0.01–0.04)
UROBILINOGEN FLD QL: NEGATIVE MG/DL — SIGNIFICANT CHANGE UP
WBC # BLD: 8.7 K/UL — SIGNIFICANT CHANGE UP (ref 3.8–10.5)
WBC # FLD AUTO: 8.7 K/UL — SIGNIFICANT CHANGE UP (ref 3.8–10.5)
WBC UR QL: (no result)

## 2017-09-21 PROCEDURE — 99284 EMERGENCY DEPT VISIT MOD MDM: CPT

## 2017-09-21 PROCEDURE — 71260 CT THORAX DX C+: CPT | Mod: 26

## 2017-09-21 PROCEDURE — 71275 CT ANGIOGRAPHY CHEST: CPT | Mod: 26

## 2017-09-21 PROCEDURE — 93010 ELECTROCARDIOGRAM REPORT: CPT

## 2017-09-21 RX ORDER — NITROFURANTOIN MACROCRYSTAL 50 MG
100 CAPSULE ORAL ONCE
Qty: 0 | Refills: 0 | Status: COMPLETED | OUTPATIENT
Start: 2017-09-21 | End: 2017-09-21

## 2017-09-21 RX ORDER — SODIUM CHLORIDE 9 MG/ML
1000 INJECTION INTRAMUSCULAR; INTRAVENOUS; SUBCUTANEOUS ONCE
Qty: 0 | Refills: 0 | Status: COMPLETED | OUTPATIENT
Start: 2017-09-21 | End: 2017-09-21

## 2017-09-21 RX ORDER — WARFARIN SODIUM 2.5 MG/1
1 TABLET ORAL
Qty: 0 | Refills: 0 | COMMUNITY

## 2017-09-21 RX ORDER — CIPROFLOXACIN LACTATE 400MG/40ML
500 VIAL (ML) INTRAVENOUS ONCE
Qty: 0 | Refills: 0 | Status: COMPLETED | OUTPATIENT
Start: 2017-09-21 | End: 2017-09-21

## 2017-09-21 RX ORDER — HYDROCHLOROTHIAZIDE 25 MG
0 TABLET ORAL
Qty: 0 | Refills: 0 | COMMUNITY

## 2017-09-21 RX ORDER — MECLIZINE HCL 12.5 MG
1 TABLET ORAL
Qty: 20 | Refills: 0 | OUTPATIENT
Start: 2017-09-21

## 2017-09-21 RX ORDER — MOXIFLOXACIN HYDROCHLORIDE TABLETS, 400 MG 400 MG/1
1 TABLET, FILM COATED ORAL
Qty: 20 | Refills: 0 | OUTPATIENT
Start: 2017-09-21 | End: 2017-10-01

## 2017-09-21 RX ADMIN — SODIUM CHLORIDE 1000 MILLILITER(S): 9 INJECTION INTRAMUSCULAR; INTRAVENOUS; SUBCUTANEOUS at 13:13

## 2017-09-21 RX ADMIN — Medication 100 MILLIGRAM(S): at 13:51

## 2017-09-21 RX ADMIN — Medication 500 MILLIGRAM(S): at 14:59

## 2017-09-21 NOTE — ED STATDOCS - OBJECTIVE STATEMENT
48 y/o female with PMHx of DVT, HTN presents to the ED c/o dizziness.  She states she has a blood clot in leg dx in this ED a month ago.  She has +SOB, +DOA, +nausea, +SOB, +difficulty concentrating, +abdominal pain since the DVT dx.  She has had vertigo in the past and this feels similar but worse.  Normal BM yesterday, no urinary dysfunction. Denies CP, HA, fall, vomiting, diarrhea.  On Coumadin.  No PCP.  Nonsmoker.  Will transfer pt to main ED for further evaluation

## 2017-09-21 NOTE — ED PROVIDER NOTE - MUSCULOSKELETAL, MLM
Spine appears normal, range of motion is not limited, no muscle or joint tenderness. 1+ pitting edema bilateral lower extremities

## 2017-09-21 NOTE — ED ADULT NURSE NOTE - OBJECTIVE STATEMENT
Pt complains of worsening dizziness over past several days.  Cardiac and VS monitoring initiated on arrival.  20g PIV placed.

## 2017-09-21 NOTE — ED STATDOCS - NS_ ATTENDINGSCRIBEDETAILS _ED_A_ED_FT
I, Toño Beth MD, performed the initial face to face bedside interview with this patient regarding history of present illness and determined that the patient should be seen in the main ED.  The history, was documented by the scribe in my presence and I attest to the accuracy of the documentation.

## 2017-09-21 NOTE — ED ADULT TRIAGE NOTE - CHIEF COMPLAINT QUOTE
Pt presents to ED c/o feeling dizzy for 3 days with decreased appetite. Pt reports she is on Coumadin for LLE blood clot for 2 months. Pt reports nausea but denies vomiting

## 2017-09-21 NOTE — ED PROVIDER NOTE - CARDIAC, MLM
Normal rate, regular rhythm.  Heart sounds S1, S2.  No murmurs, rubs or gallops. 1+ pitting edema bilateral lower extremities

## 2017-09-21 NOTE — ED STATDOCS - PROGRESS NOTE DETAILS
Chemistry lab machine down.  Taking exceedingly long time to obtain chemistry.  Okay to scan without labs 48 y/o female with PMHx of DVT, HTN presents to the ED c/o dizziness.  She states she has a blood clot in leg dx in this ED a month ago.  She has +SOB, +DOA, +nausea, +SOB, +difficulty concentrating, +abdominal pain since the DVT dx.  She has had vertigo in the past and this feels similar but worse.  Normal BM yesterday, no urinary dysfunction. Denies CP, HA, fall, vomiting, diarrhea.  On Coumadin.  No PCP.  Nonsmoker.  Will transfer pt to main ED for further evaluation

## 2017-09-21 NOTE — ED PROVIDER NOTE - MEDICAL DECISION MAKING DETAILS
Pt with vertigo-like dizziness (has had in past) and concern for PE given known DVT. Plabn labs, CTA, reeval

## 2017-09-21 NOTE — ED PROVIDER NOTE - OBJECTIVE STATEMENT
48 y/o female with PMHx of DVT to left lower extremity, HTN presents to the ED c/o dizziness for a few days.  She states she has a blood clot in leg dx in this ED a month ago. Also c/o SOB, MARTIN, nausea, abd pain since DVT dx. She has had vertigo in the past and this feels similar but worse. States that dizziness feels as if the room is spinning, worse upon head movement, with associated nausea. Normal BM yesterday, no urinary dysfunction. Denies CP, HA, fall, vomiting, diarrhea, burning urination, or any other acute complaints. States that right now she doesn't feel dizzy here in ED. On Coumadin. Last I&R 2.1. Pt didn't take any meds to relieve sx. No PCP. Nonsmoker.

## 2017-09-22 LAB
CULTURE RESULTS: SIGNIFICANT CHANGE UP
SPECIMEN SOURCE: SIGNIFICANT CHANGE UP

## 2017-09-27 ENCOUNTER — APPOINTMENT (OUTPATIENT)
Dept: CARDIOLOGY | Facility: CLINIC | Age: 47
End: 2017-09-27
Payer: MEDICAID

## 2017-09-27 PROCEDURE — 85610 PROTHROMBIN TIME: CPT | Mod: QW

## 2017-09-27 PROCEDURE — 36416 COLLJ CAPILLARY BLOOD SPEC: CPT

## 2017-09-27 PROCEDURE — 99213 OFFICE O/P EST LOW 20 MIN: CPT

## 2017-10-04 ENCOUNTER — APPOINTMENT (OUTPATIENT)
Dept: CARDIOLOGY | Facility: CLINIC | Age: 47
End: 2017-10-04
Payer: MEDICAID

## 2017-10-04 PROCEDURE — 99213 OFFICE O/P EST LOW 20 MIN: CPT

## 2017-10-04 PROCEDURE — 36416 COLLJ CAPILLARY BLOOD SPEC: CPT

## 2017-10-04 PROCEDURE — 85610 PROTHROMBIN TIME: CPT | Mod: QW

## 2017-10-18 ENCOUNTER — APPOINTMENT (OUTPATIENT)
Dept: CARDIOLOGY | Facility: CLINIC | Age: 47
End: 2017-10-18

## 2017-10-21 ENCOUNTER — APPOINTMENT (OUTPATIENT)
Dept: CARDIOLOGY | Facility: CLINIC | Age: 47
End: 2017-10-21
Payer: OTHER MISCELLANEOUS

## 2017-10-21 DIAGNOSIS — Z71.89 OTHER SPECIFIED COUNSELING: ICD-10-CM

## 2017-10-21 PROCEDURE — 85610 PROTHROMBIN TIME: CPT | Mod: QW

## 2017-10-21 PROCEDURE — 36416 COLLJ CAPILLARY BLOOD SPEC: CPT

## 2017-10-21 PROCEDURE — 99213 OFFICE O/P EST LOW 20 MIN: CPT

## 2017-10-24 PROBLEM — Z71.89 ENCOUNTER FOR ANTICOAGULATION DISCUSSION AND COUNSELING: Status: ACTIVE | Noted: 2017-06-21

## 2017-11-08 ENCOUNTER — APPOINTMENT (OUTPATIENT)
Dept: CARDIOLOGY | Facility: CLINIC | Age: 47
End: 2017-11-08
Payer: OTHER MISCELLANEOUS

## 2017-11-08 DIAGNOSIS — Z79.01 LONG TERM (CURRENT) USE OF ANTICOAGULANTS: ICD-10-CM

## 2017-11-08 DIAGNOSIS — I82.492 ACUTE EMBOLISM AND THROMBOSIS OF OTHER SPECIFIED DEEP VEIN OF LEFT LOWER EXTREMITY: ICD-10-CM

## 2017-11-08 DIAGNOSIS — Z51.81 ENCOUNTER FOR THERAPEUTIC DRUG LVL MONITORING: ICD-10-CM

## 2017-11-08 DIAGNOSIS — Z79.01 ENCOUNTER FOR THERAPEUTIC DRUG LVL MONITORING: ICD-10-CM

## 2017-11-08 PROCEDURE — 99213 OFFICE O/P EST LOW 20 MIN: CPT

## 2017-11-08 PROCEDURE — 85610 PROTHROMBIN TIME: CPT | Mod: QW

## 2017-11-08 PROCEDURE — 36416 COLLJ CAPILLARY BLOOD SPEC: CPT

## 2017-11-29 ENCOUNTER — APPOINTMENT (OUTPATIENT)
Dept: CARDIOLOGY | Facility: CLINIC | Age: 47
End: 2017-11-29

## 2018-03-01 ENCOUNTER — OUTPATIENT (OUTPATIENT)
Dept: OUTPATIENT SERVICES | Facility: HOSPITAL | Age: 48
LOS: 1 days | End: 2018-03-01
Payer: MEDICAID

## 2018-03-01 PROCEDURE — G9001: CPT

## 2018-03-28 DIAGNOSIS — R69 ILLNESS, UNSPECIFIED: ICD-10-CM

## 2018-06-13 ENCOUNTER — APPOINTMENT (OUTPATIENT)
Dept: RHEUMATOLOGY | Facility: CLINIC | Age: 48
End: 2018-06-13

## 2018-07-27 PROBLEM — I82.409 ACUTE EMBOLISM AND THROMBOSIS OF UNSPECIFIED DEEP VEINS OF UNSPECIFIED LOWER EXTREMITY: Chronic | Status: ACTIVE | Noted: 2017-06-19

## 2018-07-27 PROBLEM — I10 ESSENTIAL (PRIMARY) HYPERTENSION: Chronic | Status: ACTIVE | Noted: 2017-09-21

## 2019-04-26 ENCOUNTER — TRANSCRIPTION ENCOUNTER (OUTPATIENT)
Age: 49
End: 2019-04-26

## 2019-07-23 ENCOUNTER — APPOINTMENT (OUTPATIENT)
Dept: ANTEPARTUM | Facility: CLINIC | Age: 49
End: 2019-07-23
Payer: MEDICAID

## 2019-07-23 ENCOUNTER — ASOB RESULT (OUTPATIENT)
Age: 49
End: 2019-07-23

## 2019-07-23 PROCEDURE — 76856 US EXAM PELVIC COMPLETE: CPT | Mod: 59

## 2019-07-23 PROCEDURE — 76830 TRANSVAGINAL US NON-OB: CPT

## 2019-08-20 ENCOUNTER — APPOINTMENT (OUTPATIENT)
Dept: UROLOGY | Facility: CLINIC | Age: 49
End: 2019-08-20
Payer: MEDICAID

## 2019-08-20 VITALS
OXYGEN SATURATION: 98 % | HEIGHT: 66 IN | TEMPERATURE: 98 F | DIASTOLIC BLOOD PRESSURE: 80 MMHG | BODY MASS INDEX: 46.61 KG/M2 | HEART RATE: 91 BPM | SYSTOLIC BLOOD PRESSURE: 137 MMHG | WEIGHT: 290 LBS

## 2019-08-20 DIAGNOSIS — Z78.9 OTHER SPECIFIED HEALTH STATUS: ICD-10-CM

## 2019-08-20 DIAGNOSIS — Z80.42 FAMILY HISTORY OF MALIGNANT NEOPLASM OF PROSTATE: ICD-10-CM

## 2019-08-20 DIAGNOSIS — Z56.0 UNEMPLOYMENT, UNSPECIFIED: ICD-10-CM

## 2019-08-20 DIAGNOSIS — Z84.1 FAMILY HISTORY OF DISORDERS OF KIDNEY AND URETER: ICD-10-CM

## 2019-08-20 PROCEDURE — 99204 OFFICE O/P NEW MOD 45 MIN: CPT

## 2019-08-20 SDOH — ECONOMIC STABILITY - INCOME SECURITY: UNEMPLOYMENT, UNSPECIFIED: Z56.0

## 2019-08-20 NOTE — REVIEW OF SYSTEMS
[Shortness Of Breath] : shortness of breath [Wheezing] : wheezing [Abdominal Pain] : abdominal pain [Vomiting] : vomiting [Diarrhea] : diarrhea [see HPI] : see HPI [Date of last menstrual period ____] : date of last menstrual period: [unfilled] [Seen by urologist before (Name)  ___] : Previously seen by a urologist: [unfilled] [Leakage of urine with straining, coughing, laughing] : leakage of urine with straining, coughing, laughing [Joint Pain] : joint pain [Joint Swelling] : joint swelling [Itching] : itching [Difficulty Walking] : difficulty walking [Negative] : Heme/Lymph

## 2019-09-03 ENCOUNTER — APPOINTMENT (OUTPATIENT)
Age: 49
End: 2019-09-03
Payer: MEDICAID

## 2019-09-03 PROBLEM — Z56.0 UNEMPLOYED: Status: ACTIVE | Noted: 2019-09-03

## 2019-09-03 PROBLEM — Z84.1 FAMILY HISTORY OF KIDNEY STONES: Status: ACTIVE | Noted: 2019-09-03

## 2019-09-03 PROBLEM — Z80.42 FH: PROSTATE CANCER: Status: ACTIVE | Noted: 2019-09-03

## 2019-09-03 PROBLEM — Z78.9 CURRENT NON-DRINKER OF ALCOHOL: Status: ACTIVE | Noted: 2019-09-03

## 2019-09-03 PROBLEM — Z78.9 NON-SMOKER: Status: ACTIVE | Noted: 2019-09-03

## 2019-09-03 PROCEDURE — 99213 OFFICE O/P EST LOW 20 MIN: CPT

## 2019-09-03 NOTE — ASSESSMENT
[FreeTextEntry1] : 48 yo F with frequent UTIs. No precipitating exposures. \par Patient was counselled on behavioral modifications to reduce her risk of urinary tract infections including wiping front to back, good general hygene, voiding following sex, avoiding douching and spermacides. We discussed cranberry supplements which may reduce UTI, though the data is equivocal. We also discussed different antibiotic therapy including low dose antibiotic suppression, post coital prophylaxis, and self start therapy. Finally, we discussed reducing UTI recurrence with topical vaginal estrogen cream. \par \par Pt opts for daily ppx. Recommend UCx in 2 weeks to confirm negative. WIll then Rx daily nitrofurantoin.

## 2019-09-04 LAB
APPEARANCE: CLEAR
BACTERIA: NEGATIVE
BILIRUBIN URINE: NEGATIVE
BLOOD URINE: NEGATIVE
COLOR: YELLOW
GLUCOSE QUALITATIVE U: NEGATIVE
HYALINE CASTS: 2 /LPF
KETONES URINE: NEGATIVE
LEUKOCYTE ESTERASE URINE: NEGATIVE
MICROSCOPIC-UA: NORMAL
NITRITE URINE: NEGATIVE
PH URINE: 6.5
PROTEIN URINE: NORMAL
RED BLOOD CELLS URINE: 4 /HPF
SPECIFIC GRAVITY URINE: 1.02
SQUAMOUS EPITHELIAL CELLS: 7 /HPF
UROBILINOGEN URINE: NORMAL
WHITE BLOOD CELLS URINE: 1 /HPF

## 2019-09-06 LAB — BACTERIA UR CULT: NORMAL

## 2019-09-23 NOTE — HISTORY OF PRESENT ILLNESS
[FreeTextEntry1] : 49 year old woman  seen 08/20/2019 with complaint of frequent UTI. This began months ago. She gets UTIs about every month. Her usual UTI symptoms are dysuria, urgency, and bladder pain. \par It is severe (9/10) in severity. Nothing makes the symptoms better, nothing makes sx worse. \par UTIs are not associated with sexual activity, swimming, or tubs baths. But they are associatd with LEFT flank pain. She is currenlty on macrobid for presumed UTI. Currently asymptomatic. \par No hematuria, no dysuria, no frequency, no urgency, no hesitancy, no straining. \par No fevers, no chills, no nausea, no vomiting, no flank pain. No family history contributory to Frequent UTIs. \par \par 09/03/2019: Patient presents for follow up. She reports  symptoms resolved with marcobid. No hematuria, no dysuria, no frequency, no urgency, no hesitancy, no straining. No incontinence. No fevers, no chills, no nausea, no vomiting, no flank pain.

## 2019-09-23 NOTE — ASSESSMENT
[FreeTextEntry1] : 50 yo F with frequent UTIs. No precipitating exposures. Pt opts for daily ppx. Will obtain UCx to confirm negative. WIll Rx daily nitrofurantoin for daily suppressive prophylaxis.

## 2019-09-23 NOTE — REVIEW OF SYSTEMS
[Shortness Of Breath] : shortness of breath [Wheezing] : wheezing [Vomiting] : vomiting [Abdominal Pain] : abdominal pain [Diarrhea] : diarrhea [see HPI] : see HPI [Date of last menstrual period ____] : date of last menstrual period: [unfilled] [Seen by urologist before (Name)  ___] : Previously seen by a urologist: [unfilled] [Leakage of urine with straining, coughing, laughing] : leakage of urine with straining, coughing, laughing [Joint Pain] : joint pain [Joint Swelling] : joint swelling [Itching] : itching [Difficulty Walking] : difficulty walking [Negative] : Heme/Lymph

## 2019-09-23 NOTE — PHYSICAL EXAM
[General Appearance - Well Developed] : well developed [General Appearance - Well Nourished] : well nourished [Normal Appearance] : normal appearance [Well Groomed] : well groomed [Abdomen Soft] : soft [General Appearance - In No Acute Distress] : no acute distress [Abdomen Tenderness] : non-tender [Costovertebral Angle Tenderness] : no ~M costovertebral angle tenderness [Urinary Bladder Findings] : the bladder was normal on palpation [Edema] : no peripheral edema [Respiration, Rhythm And Depth] : normal respiratory rhythm and effort [] : no respiratory distress [Exaggerated Use Of Accessory Muscles For Inspiration] : no accessory muscle use [Oriented To Time, Place, And Person] : oriented to person, place, and time [Affect] : the affect was normal [Mood] : the mood was normal [Not Anxious] : not anxious [Normal Station and Gait] : the gait and station were normal for the patient's age [No Focal Deficits] : no focal deficits [No Palpable Adenopathy] : no palpable adenopathy

## 2019-12-16 ENCOUNTER — MEDICATION RENEWAL (OUTPATIENT)
Age: 49
End: 2019-12-16

## 2020-01-28 ENCOUNTER — APPOINTMENT (OUTPATIENT)
Dept: RHEUMATOLOGY | Facility: CLINIC | Age: 50
End: 2020-01-28
Payer: MEDICAID

## 2020-01-28 VITALS — SYSTOLIC BLOOD PRESSURE: 128 MMHG | DIASTOLIC BLOOD PRESSURE: 82 MMHG | RESPIRATION RATE: 14 BRPM | HEART RATE: 84 BPM

## 2020-01-28 DIAGNOSIS — Z82.61 FAMILY HISTORY OF ARTHRITIS: ICD-10-CM

## 2020-01-28 PROCEDURE — 99204 OFFICE O/P NEW MOD 45 MIN: CPT

## 2020-01-28 RX ORDER — FEXOFENADINE HCL 180 MG
180 TABLET ORAL
Refills: 0 | Status: ACTIVE | COMMUNITY

## 2020-01-28 RX ORDER — LOSARTAN POTASSIUM 100 MG/1
TABLET, FILM COATED ORAL
Refills: 0 | Status: ACTIVE | COMMUNITY

## 2020-01-28 RX ORDER — MONTELUKAST SODIUM 10 MG/1
10 TABLET, FILM COATED ORAL
Refills: 0 | Status: ACTIVE | COMMUNITY

## 2020-02-03 NOTE — REVIEW OF SYSTEMS
[Negative] : Heme/Lymph [Feeling Tired] : feeling tired [Feeling Poorly] : feeling poorly [Dry Eyes] : dryness of the eyes [As Noted in HPI] : as noted in HPI [Arthralgias] : arthralgias [Joint Pain] : joint pain [Joint Stiffness] : joint stiffness

## 2020-02-03 NOTE — HISTORY OF PRESENT ILLNESS
[FreeTextEntry1] : MINE TAYLOR is a 49 year old woman who presents for evaluation of chronic paresthesias down the right arm and right leg. Duration has been since a fall in April 2019 in which she sustained a right knee fracture. Also with left knee fracture in 2017 status post traumatic fall. No falls since April. Symmetrical distal paresthesias in bilateral hands and feet, approximately 2 years, not worsening. Has not had prior imaging or w/u. ROS for paresthesias - no ETOH use, no international travel, no infectious exposures, no chronic GI issues.\par \par + Pain in MCPs > PIPs/DIPs, no synovitis, AM stiffness x hours. Feet not involved.\par + dry eyes, uses OTC eye drops frequently. SLE ROS negative for alopecia, oral sicca, salivary gland swelling, oral ulcers, malar rash, photosensitivity, SOB, chest pain, serositis, abd pain, dysuria, hematuria, rash, joint AM stiffness/synovitis, hematologic abnormalities, Raynauds. \par + chronic fatigue\par + b/l DVT, no PE, off A/C x 1 year, following with vascular -- no miscarriages, 1 uncomplicated pregnancy, hematology w/u negative, no other thrombotic events \par \par + thyroid nodules, no thyroid dz\par \par FH - maternal aunt with + blood clots, mother with RA  \par DEXA 12/ 2019 -- normal -- Ca/Vit D supplementation

## 2020-02-03 NOTE — DATA REVIEWED
[FreeTextEntry1] : Available notes, and pertinent labs & imaging in EMR reviewed. Pertinent labs and imaging summarized in HPI.

## 2020-02-03 NOTE — ASSESSMENT
[FreeTextEntry1] : MINE TAYLOR is a 49 year old woman who presents with diffuse myalgias, arthralgias with AM stiffness, fatigue, dry eyes, and paresthesias. Possible mechanical or FMS dx but need to evaluate for rheum etiologies first given possibility of CTD. \par \par - tylenol prn pain for now as no overt signs of inflammatory arthritis \par - labs as below to w/u for possible rheum etiology\par - XR CTLS spine\par - RTC in 1 month to review -- if all negative will refer to neurology for further w/u

## 2020-02-03 NOTE — PHYSICAL EXAM
[General Appearance - Alert] : alert [General Appearance - Well Nourished] : well nourished [General Appearance - In No Acute Distress] : in no acute distress [Sclera] : the sclera and conjunctiva were normal [PERRL With Normal Accommodation] : pupils were equal in size, round, and reactive to light [Extraocular Movements] : extraocular movements were intact [Outer Ear] : the ears and nose were normal in appearance [Oropharynx] : the oropharynx was normal [Neck Appearance] : the appearance of the neck was normal [Auscultation Breath Sounds / Voice Sounds] : lungs were clear to auscultation bilaterally [Heart Rate And Rhythm] : heart rate was normal and rhythm regular [Heart Sounds] : normal S1 and S2 [Heart Sounds Gallop] : no gallops [Murmurs] : no murmurs [Heart Sounds Pericardial Friction Rub] : no pericardial rub [Edema] : there was no peripheral edema [Bowel Sounds] : normal bowel sounds [Abdomen Soft] : soft [Abdomen Tenderness] : non-tender [Cervical Lymph Nodes Enlarged Anterior Bilaterally] : anterior cervical [Supraclavicular Lymph Nodes Enlarged Bilaterally] : supraclavicular [No CVA Tenderness] : no ~M costovertebral angle tenderness [No Spinal Tenderness] : no spinal tenderness [Musculoskeletal - Swelling] : no joint swelling seen [Abnormal Walk] : normal gait [Nail Clubbing] : no clubbing  or cyanosis of the fingernails [Motor Tone] : muscle strength and tone were normal [Skin Turgor] : normal skin turgor [Skin Color & Pigmentation] : normal skin color and pigmentation [FreeTextEntry1] : strength 5/5 x 4 [No Focal Deficits] : no focal deficits [] : no rash [Oriented To Time, Place, And Person] : oriented to person, place, and time [Impaired Insight] : insight and judgment were intact [Affect] : the affect was normal

## 2020-03-12 ENCOUNTER — APPOINTMENT (OUTPATIENT)
Dept: RHEUMATOLOGY | Facility: CLINIC | Age: 50
End: 2020-03-12
Payer: MEDICAID

## 2020-03-12 VITALS — SYSTOLIC BLOOD PRESSURE: 124 MMHG | DIASTOLIC BLOOD PRESSURE: 78 MMHG | RESPIRATION RATE: 14 BRPM | HEART RATE: 80 BPM

## 2020-03-12 DIAGNOSIS — M25.60 STIFFNESS OF UNSPECIFIED JOINT, NOT ELSEWHERE CLASSIFIED: ICD-10-CM

## 2020-03-12 PROCEDURE — 99214 OFFICE O/P EST MOD 30 MIN: CPT

## 2020-03-12 NOTE — HISTORY OF PRESENT ILLNESS
[FreeTextEntry1] : MINE TAYLOR is a 49 year old woman with chronic paresthesias in radicular fashion down the right arm and right leg. Duration has been since a fall in April 2019 in which she sustained a right knee fracture. Also with left knee fracture in 2017 status post traumatic fall. No falls since April. \par \par + Symmetrical distal paresthesias in bilateral hands and feet, approximately 2 years as well, not worsening. \par + Pain in MCPs > PIPs/DIPs, no synovitis but some fullness over 2nd/3rd MCPs, AM stiffness x hours. Feet not involved.\par + dry eyes, uses OTC eye drops frequently. SLE ROS negative for alopecia, oral sicca, salivary gland swelling, oral ulcers, malar rash, photosensitivity, SOB, chest pain, serositis, abd pain, dysuria, hematuria, rash, joint AM stiffness/synovitis, hematologic abnormalities, Raynauds. \par + chronic fatigue\par + b/l DVT, no PE, off A/C x 1 year, following with vascular, presently just on ASA 81mg, recent doppler and CT abd negative for clot -- no miscarriages, 1 uncomplicated pregnancy, hematology w/u negative, no other thrombotic events \par + thyroid nodules, no thyroid dz\par \par Since last visit, pain unchanged. Present in muscles and joints, worsening with pressure and laying on her R side. Milder version on L side as well. Feels very tender to touch all over. Saw neurology, placed on trial of vitamins and f/u at end of March. \par \par Labs and imaging reviewed in detail with her \par XR T spine - moderate DJD\par XR C, LS spine - normal\par XR hands/wrists - normal \par \par Labs + B2G IgG 33, ESR 48\par Negative - SLE, RA, HLAB27, SPEP, Igs\par \par FH - maternal aunt with + blood clots, mother with RA  \par DEXA 12/ 2019 -- normal -- Ca/Vit D supplementation

## 2020-03-12 NOTE — REVIEW OF SYSTEMS
[Feeling Poorly] : feeling poorly [Feeling Tired] : feeling tired [Dry Eyes] : dryness of the eyes [As Noted in HPI] : as noted in HPI [Arthralgias] : arthralgias [Joint Pain] : joint pain [Joint Stiffness] : joint stiffness [Negative] : Heme/Lymph

## 2020-03-12 NOTE — REASON FOR VISIT
[Follow-Up: _____] : a [unfilled] follow-up visit [Initial Evaluation] : an initial evaluation [FreeTextEntry1] : paresthesias

## 2020-03-12 NOTE — ASSESSMENT
[FreeTextEntry1] : MINE TAYLOR is a 49 year old woman who presents with diffuse myalgias, arthralgias with AM stiffness, fatigue, dry eyes, and paresthesias. Rheum serologies negative but with ESR elevation though this is nonspecific. + B2G in setting of prior clot. Mild fullness over scattered MCPs but otherwise not consistent with inflammatory arthritis. ? fibromyalgia vs CRPS\par \par - trial of flexeril QHS. Advised pt of sedation and advised to take when does not have to drive the following day to evaluate extent of sedation. Can reduce to 2.5mg QHS if getting benefit but too much somnolence. Pt verbalized understanding. \par - tylenol prn pain \par - f/u with neurology, will forward a copy of this note\par - consider trial of gabapentin or Cymbalta if no further interventions by neurology planned\par - discussed we can consider a trial of prednisone as well if further inflammatory sx but pt has not had response to medrol dose packs for URIs recently \par - will call to update me after neurology appt, RTC in 3 months -- repeat ESR and B2G at that time

## 2020-03-23 ENCOUNTER — APPOINTMENT (OUTPATIENT)
Dept: RHEUMATOLOGY | Facility: CLINIC | Age: 50
End: 2020-03-23

## 2020-06-08 ENCOUNTER — APPOINTMENT (OUTPATIENT)
Dept: RHEUMATOLOGY | Facility: CLINIC | Age: 50
End: 2020-06-08

## 2020-07-02 ENCOUNTER — RX RENEWAL (OUTPATIENT)
Age: 50
End: 2020-07-02

## 2020-07-02 NOTE — ED ADULT NURSE NOTE - NS PRO PASSIVE SMOKE EXP
July 5, 2020      Bee Alfaro MD  [address]  [facility] [address]           [facility]  [address]  [facility] [address]  Phone: [phone]          Patient: Ritesh Louie   MR Number: [id number]   YOB: 2013   Date of Visit: 7/2/2020       Dear Dr. Bee Alfaro:    Thank you for referring Ritesh Louie to me for evaluation. Attached you will find relevant portions of my assessment and plan of care.    If you have questions, please do not hesitate to call me. I look forward to following Ritesh Louie along with you.    Sincerely,    Montse Mcdonough MD    Enclosure  CC:  No Recipients    If you would like to receive this communication electronically, please contact [email or web address] or [phone] to request more information on [facility] Link access.    For providers and/or their staff who would like to refer a patient to [facility], please contact us through our one-stop-shop provider referral line, [facility], [place], at [phone].    If you feel you have received this communication in error or would no longer like to receive these types of communications, please e-mail [email or web address]          Unknown

## 2020-10-05 ENCOUNTER — RX RENEWAL (OUTPATIENT)
Age: 50
End: 2020-10-05

## 2020-10-15 ENCOUNTER — APPOINTMENT (OUTPATIENT)
Dept: UROLOGY | Facility: CLINIC | Age: 50
End: 2020-10-15
Payer: MEDICAID

## 2020-10-15 VITALS
DIASTOLIC BLOOD PRESSURE: 78 MMHG | WEIGHT: 280 LBS | BODY MASS INDEX: 46.65 KG/M2 | SYSTOLIC BLOOD PRESSURE: 119 MMHG | TEMPERATURE: 97.8 F | OXYGEN SATURATION: 98 % | HEIGHT: 65 IN | HEART RATE: 90 BPM

## 2020-10-15 DIAGNOSIS — R39.9 UNSPECIFIED SYMPTOMS AND SIGNS INVOLVING THE GENITOURINARY SYSTEM: ICD-10-CM

## 2020-10-15 PROCEDURE — 99213 OFFICE O/P EST LOW 20 MIN: CPT

## 2020-10-15 NOTE — PHYSICAL EXAM
[General Appearance - Well Developed] : well developed [General Appearance - Well Nourished] : well nourished [General Appearance - In No Acute Distress] : no acute distress [Normal Appearance] : normal appearance [Well Groomed] : well groomed [Abdomen Tenderness] : non-tender [Abdomen Soft] : soft [Costovertebral Angle Tenderness] : no ~M costovertebral angle tenderness [Urinary Bladder Findings] : the bladder was normal on palpation [Edema] : no peripheral edema [Respiration, Rhythm And Depth] : normal respiratory rhythm and effort [] : no respiratory distress [Exaggerated Use Of Accessory Muscles For Inspiration] : no accessory muscle use [Affect] : the affect was normal [Mood] : the mood was normal [Oriented To Time, Place, And Person] : oriented to person, place, and time [No Focal Deficits] : no focal deficits [Normal Station and Gait] : the gait and station were normal for the patient's age [Not Anxious] : not anxious [No Palpable Adenopathy] : no palpable adenopathy

## 2020-10-15 NOTE — REVIEW OF SYSTEMS
[Shortness Of Breath] : shortness of breath [Vomiting] : vomiting [Wheezing] : wheezing [Abdominal Pain] : abdominal pain [Diarrhea] : diarrhea [see HPI] : see HPI [Date of last menstrual period ____] : date of last menstrual period: [unfilled] [Joint Pain] : joint pain [Leakage of urine with straining, coughing, laughing] : leakage of urine with straining, coughing, laughing [Seen by urologist before (Name)  ___] : Previously seen by a urologist: [unfilled] [Itching] : itching [Difficulty Walking] : difficulty walking [Joint Swelling] : joint swelling [Negative] : Psychiatric

## 2020-10-16 NOTE — HISTORY OF PRESENT ILLNESS
[FreeTextEntry1] : 49 year old woman  seen 08/20/2019 with complaint of frequent UTI. This began months ago. She gets UTIs about every month. Her usual UTI symptoms are dysuria, urgency, and bladder pain. \par It is severe (9/10) in severity. Nothing makes the symptoms better, nothing makes sx worse. \par UTIs are not associated with sexual activity, swimming, or tubs baths. But they are associatd with LEFT flank pain. She is currenlty on macrobid for presumed UTI. Currently asymptomatic. \par No hematuria, no dysuria, no frequency, no urgency, no hesitancy, no straining. \par No fevers, no chills, no nausea, no vomiting, no flank pain. No family history contributory to Frequent UTIs. \par \par 09/03/2019: Patient presents for follow up. She reports  symptoms resolved with marcobid. No hematuria, no dysuria, no frequency, no urgency, no hesitancy, no straining. No incontinence. No fevers, no chills, no nausea, no vomiting, no flank pain. \par \par 10/15/2020: Patient presents for follow up. She is currently on Macrobid 100 mg QD for ppx. She reports she has not had any UTIs in the past year. She does note some left flank discomfort when she lays down which is alleviated after she voids. No hematuria, no dysuria, no frequency, no urgency, no hesitancy, no straining. No incontinence. No fevers, no chills, no nausea, no vomiting.\par

## 2020-10-16 NOTE — ASSESSMENT
[FreeTextEntry1] : 48 yo F with frequent UTIs. No precipitating exposures. Pt is on Nitrofurantoin 100 mg daily ppx. Discussed with patient given that she had no UTIs over the past year she should consider decreasing Nitrofurantoin dose to 50 mg QD and eventually take her off the ppx if she is no longer getting recurrent UTIs. Patient agrees with this plan. \par - RBUS \par - nitrofurantoin 50 mg for daily suppressive prophylaxis.\par - F/U for symptom check in 6 months or sooner PRN

## 2020-11-17 ENCOUNTER — NON-APPOINTMENT (OUTPATIENT)
Age: 50
End: 2020-11-17

## 2021-01-01 NOTE — ED ADULT NURSE NOTE - CAS ELECT INFOMATION PROVIDED
DISCHARGE SUMMARY:  Discharge Time: 1600  Via:  Transport crib and carseat  Accompanied by:  Mother and staff   Verbal Instructions given: Yes  Verbalized understanding: Yes  Written Instructions given: Yes  Patient Able to Void: Yes  Discharged Stable Per MD Order: Yes     DC instructions

## 2021-01-19 ENCOUNTER — APPOINTMENT (OUTPATIENT)
Dept: RHEUMATOLOGY | Facility: CLINIC | Age: 51
End: 2021-01-19
Payer: MEDICAID

## 2021-01-19 VITALS
TEMPERATURE: 97.9 F | WEIGHT: 270.19 LBS | HEIGHT: 65 IN | RESPIRATION RATE: 15 BRPM | OXYGEN SATURATION: 97 % | DIASTOLIC BLOOD PRESSURE: 78 MMHG | BODY MASS INDEX: 45.02 KG/M2 | HEART RATE: 93 BPM | SYSTOLIC BLOOD PRESSURE: 118 MMHG

## 2021-01-19 DIAGNOSIS — M47.22 OTHER SPONDYLOSIS WITH RADICULOPATHY, CERVICAL REGION: ICD-10-CM

## 2021-01-19 DIAGNOSIS — R20.2 PARESTHESIA OF SKIN: ICD-10-CM

## 2021-01-19 DIAGNOSIS — M79.10 MYALGIA, UNSPECIFIED SITE: ICD-10-CM

## 2021-01-19 DIAGNOSIS — R53.82 CHRONIC FATIGUE, UNSPECIFIED: ICD-10-CM

## 2021-01-19 DIAGNOSIS — H04.123 DRY EYE SYNDROME OF BILATERAL LACRIMAL GLANDS: ICD-10-CM

## 2021-01-19 DIAGNOSIS — R70.0 ELEVATED ERYTHROCYTE SEDIMENTATION RATE: ICD-10-CM

## 2021-01-19 DIAGNOSIS — M54.16 RADICULOPATHY, LUMBAR REGION: ICD-10-CM

## 2021-01-19 DIAGNOSIS — R06.83 SNORING: ICD-10-CM

## 2021-01-19 PROCEDURE — 99214 OFFICE O/P EST MOD 30 MIN: CPT

## 2021-01-19 PROCEDURE — 99072 ADDL SUPL MATRL&STAF TM PHE: CPT

## 2021-01-19 RX ORDER — CYCLOBENZAPRINE HYDROCHLORIDE 5 MG/1
5 TABLET, FILM COATED ORAL
Qty: 60 | Refills: 1 | Status: DISCONTINUED | COMMUNITY
Start: 2020-03-12 | End: 2021-01-19

## 2021-01-19 NOTE — PHYSICAL EXAM
[General Appearance - Alert] : alert [General Appearance - In No Acute Distress] : in no acute distress [Sclera] : the sclera and conjunctiva were normal [PERRL With Normal Accommodation] : pupils were equal in size, round, and reactive to light [Extraocular Movements] : extraocular movements were intact [Outer Ear] : the ears and nose were normal in appearance [Oropharynx] : the oropharynx was normal [Neck Appearance] : the appearance of the neck was normal [Auscultation Breath Sounds / Voice Sounds] : lungs were clear to auscultation bilaterally [Heart Rate And Rhythm] : heart rate was normal and rhythm regular [Heart Sounds] : normal S1 and S2 [Murmurs] : no murmurs [Edema] : there was no peripheral edema [Bowel Sounds] : normal bowel sounds [Abdomen Soft] : soft [Abdomen Tenderness] : non-tender [Cervical Lymph Nodes Enlarged Anterior Bilaterally] : anterior cervical [Supraclavicular Lymph Nodes Enlarged Bilaterally] : supraclavicular [No CVA Tenderness] : no ~M costovertebral angle tenderness [No Spinal Tenderness] : no spinal tenderness [Abnormal Walk] : normal gait [Nail Clubbing] : no clubbing  or cyanosis of the fingernails [Musculoskeletal - Swelling] : no joint swelling seen [Motor Tone] : muscle strength and tone were normal [Skin Color & Pigmentation] : normal skin color and pigmentation [] : no rash [Skin Turgor] : normal skin turgor [No Focal Deficits] : no focal deficits [Oriented To Time, Place, And Person] : oriented to person, place, and time [Impaired Insight] : insight and judgment were intact [Affect] : the affect was normal [FreeTextEntry1] : strength 5/5 x 4

## 2021-01-19 NOTE — REVIEW OF SYSTEMS
[Feeling Poorly] : feeling poorly [Feeling Tired] : feeling tired [Dry Eyes] : dryness of the eyes [Arthralgias] : arthralgias [Negative] : Heme/Lymph [As Noted in HPI] : as noted in HPI

## 2021-01-19 NOTE — HISTORY OF PRESENT ILLNESS
[FreeTextEntry1] : MINE TAYLOR is a 50 year old woman with chronic paresthesias in radicular fashion down the right arm and right leg. Duration has been since a fall in April 2019 in which she sustained a right knee fracture. Also with left knee fracture in 2017 status post traumatic fall. No falls since April. \par \par + Symmetrical distal paresthesias in bilateral hands and feet, approximately 2 years as well, not worsening. \par + Pain in MCPs > PIPs/DIPs, no synovitis but some fullness over 2nd/3rd MCPs, AM stiffness x hours. Feet not involved.\par + dry eyes, uses OTC eye drops frequently. SLE ROS negative for alopecia, oral sicca, salivary gland swelling, oral ulcers, malar rash, photosensitivity, SOB, chest pain, serositis, abd pain, dysuria, hematuria, rash, joint AM stiffness/synovitis, hematologic abnormalities, Raynauds. \par + chronic fatigue\par + b/l DVT, no PE, off A/C x 1 year, following with vascular, presently just on ASA 81mg, recent doppler and CT abd negative for clot -- no miscarriages, 1 uncomplicated pregnancy, hematology w/u negative, no other thrombotic events \par + thyroid nodules, no thyroid dz\par \par Since last visit, pain unchanged. Present in muscles and joints, worsening with pressure and laying on her R side. Milder version on L side as well. Feels very tender to touch all over. Saw neurology, placed on trial of vitamins and f/u at end of March. \par \par Labs and imaging reviewed in detail with her \par XR T spine - moderate DJD\par XR C, LS spine - normal\par XR hands/wrists - normal \par \par Labs + B2G IgG 33, ESR 48\par Negative - SLE, RA, HLAB27, SPEP, Igs\par \par FH - maternal aunt with + blood clots, mother with RA  \par DEXA 12/ 2019 -- normal -- Ca/Vit D supplementation \par \par ---------\par 1/19/21 -- ongoing R arm and R leg paresthesias, worsening since last visit, worse when sleeping, no weakness, no inflammatory arthritis changes. Muscle relaxer minimally helpful so stopped it. Neurology recommended PT, but hasn't started yet. Also noted to snore, has appt for sleep eval but unable to make it. Feels well otherwise, losing weight with Ozempic. Dry eyes stable, fatigue stable.

## 2021-01-19 NOTE — ASSESSMENT
[FreeTextEntry1] : MINE TAYLOR is a 50 year old woman with chronic diffuse myalgias and arthralgias, fatigue, mild dry eyes, and R sided paresthesias which are likely radicular in setting of known DJD. Rheum serologies negative and low suspicion for inflammatory process at present. + B2G in setting of prior clot, no further clotting issues. \par \par - d/c flexeril as not effective. Offered trial of another muscle relaxer but pt would like to hold off at present. \par - tylenol prn pain, can also take OTC NSAIDs sparingly with food to see if helps, advised QHS dosing as nighttime most symptomatic. \par - agree with trial of PT\par - can try acupuncture and massage as well, heat to affected areas as well\par - f/u with neurology \par - next steps following PT if not effective discussed with her -- Medrol dose pack for severe radicular sx vs local epidural injections vs trial of gabapentin or Cymbalta if no further interventions by neurology planned\par - sleep referral given, will help set up appointment \par - no further serological testing at present \par - AT q6 prn for dry eyes \par - RTC in 3 months

## 2021-01-21 DIAGNOSIS — Z20.828 CONTACT WITH AND (SUSPECTED) EXPOSURE TO OTHER VIRAL COMMUNICABLE DISEASES: ICD-10-CM

## 2021-01-26 DIAGNOSIS — Z20.822 ENCOUNTER FOR PREPROCEDURAL LABORATORY EXAMINATION: ICD-10-CM

## 2021-01-26 DIAGNOSIS — Z01.812 ENCOUNTER FOR PREPROCEDURAL LABORATORY EXAMINATION: ICD-10-CM

## 2021-02-01 ENCOUNTER — APPOINTMENT (OUTPATIENT)
Dept: INTERNAL MEDICINE | Facility: CLINIC | Age: 51
End: 2021-02-01

## 2021-04-19 ENCOUNTER — RX RENEWAL (OUTPATIENT)
Age: 51
End: 2021-04-19

## 2021-06-11 ENCOUNTER — NON-APPOINTMENT (OUTPATIENT)
Age: 51
End: 2021-06-11

## 2022-09-01 NOTE — HISTORY OF PRESENT ILLNESS
[facility]    Medicine Progress Note - Hospitalist Service    Date of Admission:  8/25/2022    Assessment & Plan            89-year-old female with history of atrial flutter, coronary disease, cardiomyopathy, heart failure with preserved ejection fraction, hyperlipidemia, hypertension, left bundle branch block, mitral regurgitation, sleep apnea, stroke, supraventricular tachycardia, tobacco abuse who presented with acute on chronic diastolic congestive heart failure.  Developed acute renal insufficiency due to IV diuretic effects.  Orthopedics ordered TLSO brace and recommending conservative management of chronic thoracic compression fractures. Cardiology is following.       Acute heart failure preserved ejection fraction exacerbation  Acute respiratory failure with hypoxia  Continue metoprolol 37.5 mg twice daily  Had been on furosemide 40 mg IV every 12 hours but then held due to increased Cr; able to resume at lower dose on 8/31, 20mg IV q8h  Monitor I/Os  Continue cardiac monitoring.  I-S encourage use, continue supplemental oxygen.   Attempt to wean off of oxygen as tolerated.   Appreciate cardiology recommendations.  Palliative Care consulted - appreciate their input     Atrial fibrillation with RVR  History of AV block requiring pacemaker placement.  Continue metoprolol 37.5 mg twice daily.  Continue rivaroxaban 15 mg daily    Constipation  Added senna, colace; appreciate Palliative input with scheduling meds  Prn dulcolax suppository, tap water enema to assist with constipation  Would avoid Miralax given diuresis and decreased PO intake  Added preparation H for hemorrhoids     Acute renal insufficiency unlikely acute kidney injury.  Metabolic alkalosis  This is likely secondary to overdiuresis.  Patient with evidence of contraction alkalosis.  Diuretics had been held with some improvement in labs; resumed at lower dose as above and will hopefully be able to switch to PO soon  Cont  [FreeTextEntry1] : 49 year old woman  seen 08/20/2019 with complaint of frequent UTI. This began months ago. She gets UTIs about every month. Her usual UTI symptoms are dysuria, urgency, and bladder pain. \par It is severe (9/10) in severity. Nothing makes the symptoms better, nothing makes sx worse. \par UTIs are not associated with sexual activity, swimming, or tubs baths. But they are associatd with LEFT flank pain. She is currenlty on macrobid for presumed UTI. Currently asymptomatic. \par No hematuria, no dysuria, no frequency, no urgency, no hesitancy, no straining. \par No fevers, no chills, no nausea, no vomiting, no flank pain. \par \par No family history contributory to Frequent UTIs. \par \par uti q1 month\par LEFT flank pain w UTI\par was on macrobid at last visit\par - ppx to monitor     Normocytic anemia  No clinical evidence for bleeding.  Continue anticoagulation as above.     Thoracic compression fracture  Chronic pain  MRI with chronic T1, T7 compression fractures.  Neurologically intact on exam.  TLSO brace fitting - pt does not think she wants to wear it for 3-6 months at her age  Continue home PTA medication: Morphine MS IR 15 mg every 8 hours as needed.  Follow up with non-operative orthopedics, Dr. Gan.        Diet: Combination Diet Low Saturated Fat Na <2400mg Diet, No Caffeine Diet  Room Service  Snacks/Supplements Adult: Magic Cup; With Meals    DVT Prophylaxis: DOAC  Kelly Catheter: Not present  Central Lines: None  Cardiac Monitoring: None  Code Status: No CPR- Do NOT Intubate      Disposition Plan      Expected Discharge Date: 09/02/2022    Discharge Delays: IV Medication - consider oral or Home Infusion  Oxygen Needs - Arrange Home O2  Destination: assisted living;home with help/services (return to [facility] and resume Home PT/OT)  Discharge Comments: IV Lasix; 2L/NC        The patient's care was discussed with the Bedside Nurse, Care Coordinator/ and Patient.    Sonja Young MD  Hospitalist Service  [facility]  Securely message with the Vocera Web Console (learn more here)  Text page via [employer] Paging/Directory         Clinically Significant Risk Factors Present on Admission                      ______________________________________________________________________    Interval History   Pt had a small bowel movement this morning and a medium one overnight but still feels like she needs to go more. No abd pain. Worried about having hemorrhoids since she has not had them before. Denies SOB, CP.    Data reviewed today: I reviewed all medications, new labs and imaging results over the last 24 hours. I personally reviewed no images or EKG's today.    Physical Exam   Vital Signs: Temp: 97.6  F (36.4  C) Temp src: Oral BP: 123/56 Pulse:  88   Resp: 20 SpO2: 96 % O2 Device: Nasal cannula Oxygen Delivery: 1 LPM  Weight: 144 lbs 3.2 oz  Constitutional: WDWN female lying in bed in NAD  HEENT: NC/AT, no scleral icterus, nl conjunctiva, moist oral mucosa  Respiratory: good inspiratory effort, diminished but clear  Cardiovascular: RRR, no M/R/G, 2+ radial pulses  GI: nondistended  Skin: no rashes, warm, dry  Neuro: CN 2-12 grossly intact  MSK:  able to move all 4 exts  Psych: nl mood/affect/judgment.      Data   Recent Labs   Lab 09/01/22  0446 09/01/22  0001 08/31/22  0428 08/30/22  0441 08/27/22  0843 08/26/22  0432   WBC  --   --   --  3.3*  --  4.7   HGB  --   --   --  10.6*  --  10.6*   MCV  --   --   --  98  --  98   PLT  --   --   --  144*  --  189     --  137 137   < > 139   POTASSIUM 3.3*  --  3.5 3.6   < > 3.7   CHLORIDE 88*  --  88* 88*   < > 101   CO2 38*  --  40* 42*   < > 27   BUN 34*  --  42* 43*   < > 56*   CR 1.23*  --  1.39* 1.58*   < > 1.20*   ANIONGAP 11  --  9 7   < > 11   BARRETT 8.7  --  8.7 8.9   < > 8.3*    106* 116 107   < > 89    < > = values in this interval not displayed.     No results found for this or any previous visit (from the past 24 hour(s)).  Medications     - MEDICATION INSTRUCTIONS -         aspirin  81 mg Oral Every Other Day     cetirizine  10 mg Oral Daily     ferrous sulfate  325 mg Oral Daily     furosemide  20 mg Intravenous Q8H     Lidocaine  1 patch Transdermal Q24H     lidocaine   Transdermal Q24H     metoprolol tartrate  37.5 mg Oral BID     multivitamin  with lutein  1 capsule Oral BID     rivaroxaban ANTICOAGULANT  15 mg Oral Daily with supper     sennosides  1-2 tablet Oral At Bedtime     sodium chloride (PF)  3 mL Intracatheter Q8H     Vitamin D3  25 mcg Oral Daily

## 2022-12-06 ENCOUNTER — NON-APPOINTMENT (OUTPATIENT)
Age: 52
End: 2022-12-06

## 2022-12-06 PROBLEM — R39.9 UTI SYMPTOMS: Status: ACTIVE | Noted: 2022-12-06

## 2022-12-08 ENCOUNTER — APPOINTMENT (OUTPATIENT)
Dept: UROLOGY | Facility: CLINIC | Age: 52
End: 2022-12-08

## 2022-12-08 VITALS
OXYGEN SATURATION: 98 % | HEIGHT: 65 IN | SYSTOLIC BLOOD PRESSURE: 224 MMHG | DIASTOLIC BLOOD PRESSURE: 86 MMHG | HEART RATE: 79 BPM | WEIGHT: 235 LBS | BODY MASS INDEX: 39.15 KG/M2

## 2022-12-08 DIAGNOSIS — R10.9 UNSPECIFIED ABDOMINAL PAIN: ICD-10-CM

## 2022-12-08 PROCEDURE — 99213 OFFICE O/P EST LOW 20 MIN: CPT

## 2022-12-08 RX ORDER — NITROFURANTOIN (MONOHYDRATE/MACROCRYSTALS) 25; 75 MG/1; MG/1
100 CAPSULE ORAL
Qty: 14 | Refills: 3 | Status: ACTIVE | COMMUNITY
Start: 2022-12-08 | End: 1900-01-01

## 2022-12-08 RX ORDER — SEMAGLUTIDE 1.34 MG/ML
4 INJECTION, SOLUTION SUBCUTANEOUS
Qty: 3 | Refills: 0 | Status: ACTIVE | COMMUNITY
Start: 2022-12-01

## 2022-12-08 RX ORDER — NITROFURANTOIN MACROCRYSTALS 100 MG/1
100 CAPSULE ORAL
Qty: 14 | Refills: 0 | Status: ACTIVE | COMMUNITY
Start: 2022-12-08 | End: 1900-01-01

## 2022-12-08 NOTE — ASSESSMENT
[FreeTextEntry1] : 51 yo F with frequent UTIs, wihtout UTIs for some time. \par UA, UCx today. will start empric abx. Will Rx more abx for post coital ppx. RBUS for flank pain.

## 2022-12-08 NOTE — HISTORY OF PRESENT ILLNESS
[FreeTextEntry1] : 49 year old woman  seen 08/20/2019 with complaint of frequent UTI. This began months ago. She gets UTIs about every month. Her usual UTI symptoms are dysuria, urgency, and bladder pain. \par It is severe (9/10) in severity. Nothing makes the symptoms better, nothing makes sx worse. \par UTIs are not associated with sexual activity, swimming, or tubs baths. But they are associatd with LEFT flank pain. She is currenlty on macrobid for presumed UTI. Currently asymptomatic. \par No hematuria, no dysuria, no frequency, no urgency, no hesitancy, no straining. \par No fevers, no chills, no nausea, no vomiting, no flank pain. No family history contributory to Frequent UTIs. \par \par 09/03/2019: Patient presents for follow up. She reports  symptoms resolved with marcobid. No hematuria, no dysuria, no frequency, no urgency, no hesitancy, no straining. No incontinence. No fevers, no chills, no nausea, no vomiting, no flank pain. \par \par 10/15/2020: Patient presents for follow up. She is currently on Macrobid 100 mg QD for ppx. She reports she has not had any UTIs in the past year. She does note some left flank discomfort when she lays down which is alleviated after she voids. No hematuria, no dysuria, no frequency, no urgency, no hesitancy, no straining. No incontinence. No fevers, no chills, no nausea, no vomiting.\par \par 12/08/2022: Patient presents for follow up. She reports stopping continuous abx ppx about a year ago, had not gotten another UTI until now. She feels urgency, flank pain, dysuria. She feels UTI was associated with sexual activity.

## 2022-12-09 ENCOUNTER — NON-APPOINTMENT (OUTPATIENT)
Age: 52
End: 2022-12-09

## 2022-12-11 ENCOUNTER — APPOINTMENT (OUTPATIENT)
Dept: ULTRASOUND IMAGING | Facility: CLINIC | Age: 52
End: 2022-12-11

## 2022-12-11 ENCOUNTER — OUTPATIENT (OUTPATIENT)
Dept: OUTPATIENT SERVICES | Facility: HOSPITAL | Age: 52
LOS: 1 days | End: 2022-12-11
Payer: MEDICAID

## 2022-12-11 DIAGNOSIS — R10.9 UNSPECIFIED ABDOMINAL PAIN: ICD-10-CM

## 2022-12-11 PROCEDURE — 76775 US EXAM ABDO BACK WALL LIM: CPT

## 2022-12-11 PROCEDURE — 76775 US EXAM ABDO BACK WALL LIM: CPT | Mod: 26

## 2022-12-12 LAB
APPEARANCE: ABNORMAL
BACTERIA: NEGATIVE
BILIRUBIN URINE: NEGATIVE
BLOOD URINE: ABNORMAL
CALCIUM OXALATE CRYSTALS: ABNORMAL
COLOR: YELLOW
GLUCOSE QUALITATIVE U: NEGATIVE
HYALINE CASTS: 2 /LPF
KETONES URINE: NEGATIVE
LEUKOCYTE ESTERASE URINE: NEGATIVE
MICROSCOPIC-UA: NORMAL
NITRITE URINE: NEGATIVE
PH URINE: 6
PROTEIN URINE: ABNORMAL
RED BLOOD CELLS URINE: 644 /HPF
SPECIFIC GRAVITY URINE: 1.02
SQUAMOUS EPITHELIAL CELLS: 7 /HPF
UROBILINOGEN URINE: NORMAL
WHITE BLOOD CELLS URINE: 1 /HPF

## 2022-12-13 LAB — BACTERIA UR CULT: ABNORMAL

## 2022-12-14 ENCOUNTER — NON-APPOINTMENT (OUTPATIENT)
Age: 52
End: 2022-12-14

## 2022-12-14 DIAGNOSIS — N39.0 URINARY TRACT INFECTION, SITE NOT SPECIFIED: ICD-10-CM

## 2022-12-14 RX ORDER — NITROFURANTOIN MACROCRYSTALS 50 MG/1
50 CAPSULE ORAL
Qty: 30 | Refills: 0 | Status: COMPLETED | COMMUNITY
Start: 2020-10-15 | End: 2022-12-14

## 2022-12-14 RX ORDER — NITROFURANTOIN (MONOHYDRATE/MACROCRYSTALS) 25; 75 MG/1; MG/1
100 CAPSULE ORAL
Qty: 30 | Refills: 2 | Status: COMPLETED | COMMUNITY
Start: 2019-09-03 | End: 2022-12-14

## 2022-12-14 RX ORDER — CIPROFLOXACIN HYDROCHLORIDE 500 MG/1
500 TABLET, FILM COATED ORAL TWICE DAILY
Qty: 10 | Refills: 0 | Status: ACTIVE | COMMUNITY
Start: 2022-12-14 | End: 1900-01-01

## 2022-12-19 ENCOUNTER — NON-APPOINTMENT (OUTPATIENT)
Age: 52
End: 2022-12-19

## 2023-01-01 NOTE — ED PROVIDER NOTE - NS_ATTENDINGSCRIBE_ED_ALL_ED
34.5 I personally performed the service described in the documentation recorded by the scribe in my presence, and it accurately and completely records my words and actions.

## 2023-05-04 ENCOUNTER — EMERGENCY (EMERGENCY)
Facility: HOSPITAL | Age: 53
LOS: 0 days | Discharge: HOME | End: 2023-05-04
Attending: EMERGENCY MEDICINE
Payer: MEDICAID

## 2023-05-04 VITALS
TEMPERATURE: 98 F | HEART RATE: 82 BPM | WEIGHT: 250 LBS | RESPIRATION RATE: 18 BRPM | DIASTOLIC BLOOD PRESSURE: 58 MMHG | OXYGEN SATURATION: 99 % | HEIGHT: 65 IN | SYSTOLIC BLOOD PRESSURE: 105 MMHG

## 2023-05-04 VITALS
DIASTOLIC BLOOD PRESSURE: 89 MMHG | RESPIRATION RATE: 18 BRPM | TEMPERATURE: 98 F | HEART RATE: 72 BPM | OXYGEN SATURATION: 100 % | SYSTOLIC BLOOD PRESSURE: 137 MMHG

## 2023-05-04 DIAGNOSIS — S09.90XA UNSPECIFIED INJURY OF HEAD, INITIAL ENCOUNTER: ICD-10-CM
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
## 2023-05-04 DIAGNOSIS — R51.9 HEADACHE, UNSPECIFIED: ICD-10-CM

## 2023-05-04 DIAGNOSIS — S01.01XA LACERATION WITHOUT FOREIGN BODY OF SCALP, INITIAL ENCOUNTER: ICD-10-CM

## 2023-05-04 DIAGNOSIS — Z88.1 ALLERGY STATUS TO OTHER ANTIBIOTIC AGENTS STATUS: ICD-10-CM

## 2023-05-04 DIAGNOSIS — I10 ESSENTIAL (PRIMARY) HYPERTENSION: ICD-10-CM

## 2023-05-04 DIAGNOSIS — Z88.2 ALLERGY STATUS TO SULFONAMIDES: ICD-10-CM

## 2023-05-04 DIAGNOSIS — Z86.718 PERSONAL HISTORY OF OTHER VENOUS THROMBOSIS AND EMBOLISM: ICD-10-CM

## 2023-05-04 DIAGNOSIS — Z23 ENCOUNTER FOR IMMUNIZATION: ICD-10-CM
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
## 2023-05-04 PROCEDURE — 70450 CT HEAD/BRAIN W/O DYE: CPT | Mod: MA

## 2023-05-04 PROCEDURE — 99284 EMERGENCY DEPT VISIT MOD MDM: CPT

## 2023-05-04 PROCEDURE — 90715 TDAP VACCINE 7 YRS/> IM: CPT

## 2023-05-04 PROCEDURE — 90471 IMMUNIZATION ADMIN: CPT

## 2023-05-04 PROCEDURE — 99284 EMERGENCY DEPT VISIT MOD MDM: CPT | Mod: 25

## 2023-05-04 PROCEDURE — 70450 CT HEAD/BRAIN W/O DYE: CPT | Mod: 26,MA

## 2023-05-04 RX ORDER — ACETAMINOPHEN 500 MG
650 TABLET ORAL ONCE
Refills: 0 | Status: COMPLETED | OUTPATIENT
Start: 2023-05-04 | End: 2023-05-04

## 2023-05-04 RX ORDER — TETANUS TOXOID, REDUCED DIPHTHERIA TOXOID AND ACELLULAR PERTUSSIS VACCINE, ADSORBED 5; 2.5; 8; 8; 2.5 [IU]/.5ML; [IU]/.5ML; UG/.5ML; UG/.5ML; UG/.5ML
0.5 SUSPENSION INTRAMUSCULAR ONCE
Refills: 0 | Status: COMPLETED | OUTPATIENT
Start: 2023-05-04 | End: 2023-05-04

## 2023-05-04 RX ADMIN — Medication 650 MILLIGRAM(S): at 08:27

## 2023-05-04 RX ADMIN — TETANUS TOXOID, REDUCED DIPHTHERIA TOXOID AND ACELLULAR PERTUSSIS VACCINE, ADSORBED 0.5 MILLILITER(S): 5; 2.5; 8; 8; 2.5 SUSPENSION INTRAMUSCULAR at 09:14

## 2023-05-04 NOTE — ED PROVIDER NOTE - NSFOLLOWUPINSTRUCTIONS_ED_ALL_ED_FT
Head Injury  head abrasion  wash with soap and water once to twice a day, pat dry  tylenol 650mg every 4 to 6 hours for pain and/or ibuprofen 600mg every 6 hours    WHAT YOU NEED TO KNOW:    A head injury can include your scalp, face, skull, or brain and range from mild to severe. Effects can appear immediately after the injury or develop later. The effects may last a short time or be permanent. Healthcare providers may want to check your recovery over time. Treatment may change as you recover or develop new health problems from the head injury.    DISCHARGE INSTRUCTIONS:    Call your local emergency number (911 in the ), or have someone else call if:    You cannot be woken.    You have a seizure.    You stop responding to others or you faint.    You have blurry or double vision.    Your speech becomes slurred or confused.    You have arm or leg weakness, loss of feeling, or new problems with coordination.    Your pupils are larger than usual, or one pupil is a different size than the other.    You have blood or clear fluid coming out of your ears or nose.  Return to the emergency department if:    You have repeated or forceful vomiting.    You feel confused.    Your headache gets worse or becomes severe.    You or someone caring for you notices that you are harder to wake than usual.  Call your doctor if:    Your symptoms last longer than 6 weeks after the injury.    You have questions or concerns about your condition or care.  Medicines:    Acetaminophen decreases pain and fever. It is available without a doctor's order. Ask how much to take and how often to take it. Follow directions. Read the labels of all other medicines you are using to see if they also contain acetaminophen, or ask your doctor or pharmacist. Acetaminophen can cause liver damage if not taken correctly.    Take your medicine as directed. Contact your healthcare provider if you think your medicine is not helping or if you have side effects. Tell your provider if you are allergic to any medicine. Keep a list of the medicines, vitamins, and herbs you take. Include the amounts, and when and why you take them. Bring the list or the pill bottles to follow-up visits. Carry your medicine list with you in case of an emergency.  Self-care:    Rest or do quiet activities. Limit your time watching TV, using the computer, or doing tasks that require a lot of thinking. Slowly return to your normal activities as directed. Do not play sports or do activities that may cause you to get hit in the head. Ask your healthcare provider when you can return to sports.    Apply ice on your head for 15 to 20 minutes every hour or as directed. Use an ice pack, or put crushed ice in a plastic bag. Cover it with a towel before you apply it to your skin. Ice helps prevent tissue damage and decreases swelling and pain.    Have someone stay with you for 24 hours , or as directed. This person can monitor you for problems and call for help if needed. When you are awake, the person should ask you a few questions every few hours to see if you are thinking clearly. An example is to ask your name or address.  Prevent another head injury:    Wear a helmet that fits properly. Do this when you play sports, or ride a bike, scooter, or skateboard. Helmets help decrease your risk for a serious head injury. Talk to your healthcare provider about other ways you can protect yourself if you play sports.    Wear your seatbelt every time you are in a car. This helps lower your risk for a head injury if you are in a car accident.  Follow up with your doctor as directed: Write down your questions so you remember to ask them during your visits.    © [employer] 1973, 2023    	  back to top            © [employer] 1973, 2023
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
## 2023-05-04 NOTE — ED PROVIDER NOTE - PATIENT PORTAL LINK FT
You can access the FollowMyHealth Patient Portal offered by [facility] by registering at the following website: [email or web address]. By joining [facility]’s FollowMyHealth portal, you will also be able to view your health information using other applications (apps) compatible with our system.
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
## 2023-05-04 NOTE — ED PROVIDER NOTE - PHYSICAL EXAMINATION
Gen:  Well appearing in NAD  Head:  posterior parietal midline approx 2 cm laceration, superficial, no active bleeding, right tmj with pain, no clicking able to open and close jaw  HEENT: pupils perrl,no pharyngeal erythema, uvula midline  Cardiac: S1S2, RRR  Abd: Soft, non tender  Resp: No distress, CTA   musculoskeletal:: no deformities, no swelling, strength +5/+5  Skin: warm and dry as visualized, no rashes  Neuro: DOBBS, aao x 4  Psych:alert, cooperative, appropriate mood and affect for situation
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
## 2023-06-26 RX ORDER — NITROFURANTOIN MACROCRYSTALS 100 MG/1
100 CAPSULE ORAL
Qty: 14 | Refills: 0 | Status: ACTIVE | COMMUNITY
Start: 2023-06-26 | End: 1900-01-01

## 2023-06-29 ENCOUNTER — APPOINTMENT (OUTPATIENT)
Dept: ULTRASOUND IMAGING | Facility: CLINIC | Age: 53
End: 2023-06-29
Payer: MEDICAID

## 2023-06-29 ENCOUNTER — NON-APPOINTMENT (OUTPATIENT)
Age: 53
End: 2023-06-29

## 2023-06-29 ENCOUNTER — APPOINTMENT (OUTPATIENT)
Dept: RADIOLOGY | Facility: CLINIC | Age: 53
End: 2023-06-29
Payer: MEDICAID

## 2023-06-29 PROCEDURE — 74018 RADEX ABDOMEN 1 VIEW: CPT

## 2023-06-29 PROCEDURE — 76770 US EXAM ABDO BACK WALL COMP: CPT

## 2023-06-30 LAB
APPEARANCE: CLEAR
BACTERIA UR CULT: NORMAL
BACTERIA: NEGATIVE /HPF
BILIRUBIN URINE: NEGATIVE
BLOOD URINE: NEGATIVE
CAST: 0 /LPF
COLOR: YELLOW
EPITHELIAL CELLS: 7 /HPF
GLUCOSE QUALITATIVE U: NEGATIVE MG/DL
KETONES URINE: NEGATIVE MG/DL
LEUKOCYTE ESTERASE URINE: NEGATIVE
MICROSCOPIC-UA: NORMAL
NITRITE URINE: NEGATIVE
PH URINE: 6
PROTEIN URINE: NORMAL MG/DL
RED BLOOD CELLS URINE: 2 /HPF
SPECIFIC GRAVITY URINE: 1.02
UROBILINOGEN URINE: 0.2 MG/DL
WHITE BLOOD CELLS URINE: 1 /HPF

## 2023-07-01 ENCOUNTER — APPOINTMENT (OUTPATIENT)
Dept: ULTRASOUND IMAGING | Facility: CLINIC | Age: 53
End: 2023-07-01

## 2023-07-01 ENCOUNTER — APPOINTMENT (OUTPATIENT)
Dept: RADIOLOGY | Facility: CLINIC | Age: 53
End: 2023-07-01

## 2023-07-07 ENCOUNTER — NON-APPOINTMENT (OUTPATIENT)
Age: 53
End: 2023-07-07

## 2023-11-30 ENCOUNTER — APPOINTMENT (OUTPATIENT)
Dept: ANTEPARTUM | Facility: CLINIC | Age: 53
End: 2023-11-30

## 2024-08-06 NOTE — ED ADULT NURSE NOTE - PATIENT DISCHARGE SIGNATURE
Render Risk Assessment In Note?: no Detail Level: Simple Additional Notes: Refer to [facility] for removal 21-Sep-2017

## 2025-02-05 VITALS — WEIGHT: 239 LBS | HEIGHT: 65 IN | BODY MASS INDEX: 39.82 KG/M2

## 2025-02-05 RX ORDER — ALBUTEROL 90 MCG
AEROSOL (GRAM) INHALATION
Refills: 0 | Status: ACTIVE | COMMUNITY

## 2025-02-06 ENCOUNTER — NON-APPOINTMENT (OUTPATIENT)
Age: 55
End: 2025-02-06

## 2025-02-06 ENCOUNTER — APPOINTMENT (OUTPATIENT)
Dept: NEUROLOGY | Facility: CLINIC | Age: 55
End: 2025-02-06
Payer: MEDICAID

## 2025-02-06 VITALS — SYSTOLIC BLOOD PRESSURE: 124 MMHG | DIASTOLIC BLOOD PRESSURE: 82 MMHG | HEART RATE: 106 BPM

## 2025-02-06 DIAGNOSIS — G89.29 PAIN IN RIGHT SHOULDER: ICD-10-CM

## 2025-02-06 DIAGNOSIS — G56.01 CARPAL TUNNEL SYNDROME, RIGHT UPPER LIMB: ICD-10-CM

## 2025-02-06 DIAGNOSIS — M25.511 PAIN IN RIGHT SHOULDER: ICD-10-CM

## 2025-02-06 PROCEDURE — 99203 OFFICE O/P NEW LOW 30 MIN: CPT

## 2025-03-05 ENCOUNTER — APPOINTMENT (OUTPATIENT)
Dept: NEUROLOGY | Facility: CLINIC | Age: 55
End: 2025-03-05

## 2025-03-06 ENCOUNTER — APPOINTMENT (OUTPATIENT)
Dept: ULTRASOUND IMAGING | Facility: CLINIC | Age: 55
End: 2025-03-06
Payer: MEDICAID

## 2025-03-06 ENCOUNTER — APPOINTMENT (OUTPATIENT)
Dept: MAMMOGRAPHY | Facility: CLINIC | Age: 55
End: 2025-03-06
Payer: MEDICAID

## 2025-03-06 ENCOUNTER — OUTPATIENT (OUTPATIENT)
Dept: OUTPATIENT SERVICES | Facility: HOSPITAL | Age: 55
LOS: 1 days | End: 2025-03-06
Payer: MEDICAID

## 2025-03-06 DIAGNOSIS — Z00.8 ENCOUNTER FOR OTHER GENERAL EXAMINATION: ICD-10-CM

## 2025-03-06 PROCEDURE — G0279: CPT

## 2025-03-06 PROCEDURE — 76642 ULTRASOUND BREAST LIMITED: CPT

## 2025-03-06 PROCEDURE — 77066 DX MAMMO INCL CAD BI: CPT | Mod: 26

## 2025-03-06 PROCEDURE — 76642 ULTRASOUND BREAST LIMITED: CPT | Mod: 26,RT

## 2025-03-06 PROCEDURE — G0279: CPT | Mod: 26

## 2025-03-06 PROCEDURE — 77066 DX MAMMO INCL CAD BI: CPT

## 2025-08-19 ENCOUNTER — OUTPATIENT (OUTPATIENT)
Dept: OUTPATIENT SERVICES | Facility: HOSPITAL | Age: 55
LOS: 1 days | End: 2025-08-19
Payer: MEDICAID

## 2025-08-19 ENCOUNTER — APPOINTMENT (OUTPATIENT)
Dept: RADIOLOGY | Facility: CLINIC | Age: 55
End: 2025-08-19
Payer: MEDICAID

## 2025-08-19 DIAGNOSIS — Z00.8 ENCOUNTER FOR OTHER GENERAL EXAMINATION: ICD-10-CM

## 2025-08-19 PROCEDURE — 77080 DXA BONE DENSITY AXIAL: CPT | Mod: 26

## 2025-08-19 PROCEDURE — 77080 DXA BONE DENSITY AXIAL: CPT

## 2025-08-23 ENCOUNTER — NON-APPOINTMENT (OUTPATIENT)
Age: 55
End: 2025-08-23